# Patient Record
Sex: MALE | Race: OTHER | HISPANIC OR LATINO | URBAN - METROPOLITAN AREA
[De-identification: names, ages, dates, MRNs, and addresses within clinical notes are randomized per-mention and may not be internally consistent; named-entity substitution may affect disease eponyms.]

---

## 2018-01-20 ENCOUNTER — INPATIENT (INPATIENT)
Facility: HOSPITAL | Age: 53
LOS: 8 days | Discharge: LEFT BEFORE DISCHARGE | End: 2018-01-29
Attending: INTERNAL MEDICINE | Admitting: INTERNAL MEDICINE
Payer: MEDICAID

## 2018-01-20 VITALS
HEART RATE: 73 BPM | DIASTOLIC BLOOD PRESSURE: 138 MMHG | TEMPERATURE: 99 F | RESPIRATION RATE: 20 BRPM | SYSTOLIC BLOOD PRESSURE: 199 MMHG | OXYGEN SATURATION: 100 %

## 2018-01-20 DIAGNOSIS — I10 ESSENTIAL (PRIMARY) HYPERTENSION: ICD-10-CM

## 2018-01-20 DIAGNOSIS — N18.6 END STAGE RENAL DISEASE: ICD-10-CM

## 2018-01-20 DIAGNOSIS — I16.0 HYPERTENSIVE URGENCY: ICD-10-CM

## 2018-01-20 DIAGNOSIS — N18.9 CHRONIC KIDNEY DISEASE, UNSPECIFIED: ICD-10-CM

## 2018-01-20 DIAGNOSIS — D64.9 ANEMIA, UNSPECIFIED: ICD-10-CM

## 2018-01-20 DIAGNOSIS — Z29.9 ENCOUNTER FOR PROPHYLACTIC MEASURES, UNSPECIFIED: ICD-10-CM

## 2018-01-20 DIAGNOSIS — R06.02 SHORTNESS OF BREATH: ICD-10-CM

## 2018-01-20 LAB
ALBUMIN SERPL ELPH-MCNC: 4 G/DL — SIGNIFICANT CHANGE UP (ref 3.3–5)
ALP SERPL-CCNC: 41 U/L — SIGNIFICANT CHANGE UP (ref 40–120)
ALT FLD-CCNC: 16 U/L — SIGNIFICANT CHANGE UP (ref 4–41)
AST SERPL-CCNC: 12 U/L — SIGNIFICANT CHANGE UP (ref 4–40)
BASE EXCESS BLDV CALC-SCNC: -4.3 MMOL/L — SIGNIFICANT CHANGE UP
BASOPHILS # BLD AUTO: 0.03 K/UL — SIGNIFICANT CHANGE UP (ref 0–0.2)
BASOPHILS NFR BLD AUTO: 0.6 % — SIGNIFICANT CHANGE UP (ref 0–2)
BILIRUB SERPL-MCNC: 0.4 MG/DL — SIGNIFICANT CHANGE UP (ref 0.2–1.2)
BLOOD GAS VENOUS - CREATININE: 6.9 MG/DL — HIGH (ref 0.5–1.3)
BUN SERPL-MCNC: 65 MG/DL — HIGH (ref 7–23)
CALCIUM SERPL-MCNC: 8.7 MG/DL — SIGNIFICANT CHANGE UP (ref 8.4–10.5)
CHLORIDE BLDV-SCNC: 111 MMOL/L — HIGH (ref 96–108)
CHLORIDE SERPL-SCNC: 106 MMOL/L — SIGNIFICANT CHANGE UP (ref 98–107)
CO2 SERPL-SCNC: 20 MMOL/L — LOW (ref 22–31)
CREAT SERPL-MCNC: 6.03 MG/DL — HIGH (ref 0.5–1.3)
EOSINOPHIL # BLD AUTO: 0.19 K/UL — SIGNIFICANT CHANGE UP (ref 0–0.5)
EOSINOPHIL NFR BLD AUTO: 3.7 % — SIGNIFICANT CHANGE UP (ref 0–6)
FERRITIN SERPL-MCNC: 813.9 NG/ML — HIGH (ref 30–400)
GAS PNL BLDV: 136 MMOL/L — SIGNIFICANT CHANGE UP (ref 136–146)
GLUCOSE BLDV-MCNC: 96 — SIGNIFICANT CHANGE UP (ref 70–99)
GLUCOSE SERPL-MCNC: 98 MG/DL — SIGNIFICANT CHANGE UP (ref 70–99)
HAV IGM SER-ACNC: NONREACTIVE — SIGNIFICANT CHANGE UP
HBV CORE IGM SER-ACNC: NONREACTIVE — SIGNIFICANT CHANGE UP
HBV SURFACE AG SER-ACNC: NEGATIVE — SIGNIFICANT CHANGE UP
HBV SURFACE AG SER-ACNC: NONREACTIVE — SIGNIFICANT CHANGE UP
HCO3 BLDV-SCNC: 21 MMOL/L — SIGNIFICANT CHANGE UP (ref 20–27)
HCT VFR BLD CALC: 28.6 % — LOW (ref 39–50)
HCT VFR BLDV CALC: 29.2 % — LOW (ref 39–51)
HCV AB S/CO SERPL IA: 0.07 S/CO — SIGNIFICANT CHANGE UP
HCV AB SERPL-IMP: SIGNIFICANT CHANGE UP
HGB BLD-MCNC: 9.5 G/DL — LOW (ref 13–17)
HGB BLDV-MCNC: 9.4 G/DL — LOW (ref 13–17)
IMM GRANULOCYTES # BLD AUTO: 0.01 # — SIGNIFICANT CHANGE UP
IMM GRANULOCYTES NFR BLD AUTO: 0.2 % — SIGNIFICANT CHANGE UP (ref 0–1.5)
IRON SATN MFR SERPL: 219 UG/DL — SIGNIFICANT CHANGE UP (ref 155–535)
IRON SATN MFR SERPL: 98 UG/DL — SIGNIFICANT CHANGE UP (ref 45–165)
LACTATE BLDV-MCNC: 1.1 MMOL/L — SIGNIFICANT CHANGE UP (ref 0.5–2)
LYMPHOCYTES # BLD AUTO: 1.22 K/UL — SIGNIFICANT CHANGE UP (ref 1–3.3)
LYMPHOCYTES # BLD AUTO: 23.5 % — SIGNIFICANT CHANGE UP (ref 13–44)
MCHC RBC-ENTMCNC: 31.5 PG — SIGNIFICANT CHANGE UP (ref 27–34)
MCHC RBC-ENTMCNC: 33.2 % — SIGNIFICANT CHANGE UP (ref 32–36)
MCV RBC AUTO: 94.7 FL — SIGNIFICANT CHANGE UP (ref 80–100)
MONOCYTES # BLD AUTO: 0.38 K/UL — SIGNIFICANT CHANGE UP (ref 0–0.9)
MONOCYTES NFR BLD AUTO: 7.3 % — SIGNIFICANT CHANGE UP (ref 2–14)
NEUTROPHILS # BLD AUTO: 3.37 K/UL — SIGNIFICANT CHANGE UP (ref 1.8–7.4)
NEUTROPHILS NFR BLD AUTO: 64.7 % — SIGNIFICANT CHANGE UP (ref 43–77)
NRBC # FLD: 0 — SIGNIFICANT CHANGE UP
NT-PROBNP SERPL-SCNC: SIGNIFICANT CHANGE UP PG/ML
PCO2 BLDV: 39 MMHG — LOW (ref 41–51)
PH BLDV: 7.34 PH — SIGNIFICANT CHANGE UP (ref 7.32–7.43)
PLATELET # BLD AUTO: 131 K/UL — LOW (ref 150–400)
PMV BLD: 12.8 FL — SIGNIFICANT CHANGE UP (ref 7–13)
PO2 BLDV: 68 MMHG — HIGH (ref 35–40)
POTASSIUM BLDV-SCNC: 4.6 MMOL/L — HIGH (ref 3.4–4.5)
POTASSIUM SERPL-MCNC: 4.8 MMOL/L — SIGNIFICANT CHANGE UP (ref 3.5–5.3)
POTASSIUM SERPL-SCNC: 4.8 MMOL/L — SIGNIFICANT CHANGE UP (ref 3.5–5.3)
PROT SERPL-MCNC: 6.6 G/DL — SIGNIFICANT CHANGE UP (ref 6–8.3)
RBC # BLD: 3.02 M/UL — LOW (ref 4.2–5.8)
RBC # FLD: 13.3 % — SIGNIFICANT CHANGE UP (ref 10.3–14.5)
SAO2 % BLDV: 94.1 % — HIGH (ref 60–85)
SODIUM SERPL-SCNC: 141 MMOL/L — SIGNIFICANT CHANGE UP (ref 135–145)
UIBC SERPL-MCNC: 121 UG/DL — SIGNIFICANT CHANGE UP (ref 110–370)
WBC # BLD: 5.2 K/UL — SIGNIFICANT CHANGE UP (ref 3.8–10.5)
WBC # FLD AUTO: 5.2 K/UL — SIGNIFICANT CHANGE UP (ref 3.8–10.5)

## 2018-01-20 PROCEDURE — 71046 X-RAY EXAM CHEST 2 VIEWS: CPT | Mod: 26

## 2018-01-20 PROCEDURE — 99222 1ST HOSP IP/OBS MODERATE 55: CPT | Mod: GC

## 2018-01-20 PROCEDURE — 99223 1ST HOSP IP/OBS HIGH 75: CPT

## 2018-01-20 RX ORDER — SEVELAMER CARBONATE 2400 MG/1
2 POWDER, FOR SUSPENSION ORAL
Qty: 0 | Refills: 0 | COMMUNITY

## 2018-01-20 RX ORDER — ATORVASTATIN CALCIUM 80 MG/1
40 TABLET, FILM COATED ORAL AT BEDTIME
Qty: 0 | Refills: 0 | Status: DISCONTINUED | OUTPATIENT
Start: 2018-01-20 | End: 2018-01-29

## 2018-01-20 RX ORDER — AMLODIPINE BESYLATE 2.5 MG/1
10 TABLET ORAL ONCE
Qty: 0 | Refills: 0 | Status: COMPLETED | OUTPATIENT
Start: 2018-01-20 | End: 2018-01-20

## 2018-01-20 RX ORDER — HEPARIN SODIUM 5000 [USP'U]/ML
5000 INJECTION INTRAVENOUS; SUBCUTANEOUS EVERY 8 HOURS
Qty: 0 | Refills: 0 | Status: DISCONTINUED | OUTPATIENT
Start: 2018-01-20 | End: 2018-01-29

## 2018-01-20 RX ORDER — AMLODIPINE BESYLATE 2.5 MG/1
1 TABLET ORAL
Qty: 0 | Refills: 0 | COMMUNITY

## 2018-01-20 RX ORDER — HYDRALAZINE HCL 50 MG
10 TABLET ORAL ONCE
Qty: 0 | Refills: 0 | Status: COMPLETED | OUTPATIENT
Start: 2018-01-20 | End: 2018-01-20

## 2018-01-20 RX ORDER — LISINOPRIL 2.5 MG/1
40 TABLET ORAL DAILY
Qty: 0 | Refills: 0 | Status: DISCONTINUED | OUTPATIENT
Start: 2018-01-20 | End: 2018-01-29

## 2018-01-20 RX ORDER — AMLODIPINE BESYLATE 2.5 MG/1
10 TABLET ORAL DAILY
Qty: 0 | Refills: 0 | Status: DISCONTINUED | OUTPATIENT
Start: 2018-01-21 | End: 2018-01-29

## 2018-01-20 RX ORDER — ATORVASTATIN CALCIUM 80 MG/1
1 TABLET, FILM COATED ORAL
Qty: 0 | Refills: 0 | COMMUNITY

## 2018-01-20 RX ORDER — SEVELAMER CARBONATE 2400 MG/1
1600 POWDER, FOR SUSPENSION ORAL
Qty: 0 | Refills: 0 | Status: DISCONTINUED | OUTPATIENT
Start: 2018-01-20 | End: 2018-01-29

## 2018-01-20 RX ADMIN — LISINOPRIL 40 MILLIGRAM(S): 2.5 TABLET ORAL at 08:40

## 2018-01-20 RX ADMIN — ATORVASTATIN CALCIUM 40 MILLIGRAM(S): 80 TABLET, FILM COATED ORAL at 21:23

## 2018-01-20 RX ADMIN — AMLODIPINE BESYLATE 10 MILLIGRAM(S): 2.5 TABLET ORAL at 08:40

## 2018-01-20 RX ADMIN — Medication 10 MILLIGRAM(S): at 05:14

## 2018-01-20 RX ADMIN — Medication 10 MILLIGRAM(S): at 08:40

## 2018-01-20 RX ADMIN — HEPARIN SODIUM 5000 UNIT(S): 5000 INJECTION INTRAVENOUS; SUBCUTANEOUS at 21:23

## 2018-01-20 NOTE — H&P ADULT - ASSESSMENT
53 yo homeless M w/ HTN, ESRD on HD (MWF) who presents with SOB and missed HD, a/w hypertensive urgency and need to re-establish/receive HD.

## 2018-01-20 NOTE — ED PROVIDER NOTE - OBJECTIVE STATEMENT
53yo M w/ pmhx of HTN, DM, ESRD on HD MWF c/o of SOB since yesterday. Pt states that he missed dialysis for at least the past 2 weeks. Denies any chest pain, nausea, vomiting, fever, productive cough, leg swelling, hx of blood clots or any other symptoms.

## 2018-01-20 NOTE — H&P ADULT - PROBLEM SELECTOR PLAN 1
- BP elevated to 224/144 in ED, s/p IV hydralazine x2, with subsequent improvement in BP to SBP 160s and improvement in SOB symptoms  - c/w BP regimen, reinforce medication compliance, monitor BP and titrate regimen as needed  - also in setting of missed HD for 1.5 weeks  - appreciate renal assistance with HD

## 2018-01-20 NOTE — H&P ADULT - NSHPSOCIALHISTORY_GEN_ALL_CORE
Homeless, lives in shelter (motel) in [place], previous shelter in [place], no family in area  Denies smoking/alcohol/drugs

## 2018-01-20 NOTE — CONSULT NOTE ADULT - PROBLEM SELECTOR RECOMMENDATION 3
In setting of ESRD: Hb noted to be 9.5 which is not at goal. Check serum ferritin and TIBC. Monitor Hb level. In setting of ESRD: Hb noted to be 9.5 which is not at goal. Check serum ferritin and TIBC. Monitor Hb level.  Start PHYLLIS when blood pressure under better control.

## 2018-01-20 NOTE — H&P ADULT - PROBLEM SELECTOR PLAN 2
- ESRD likely 2/2 HTN  - receiving HD via R permacath but missed 1.5 weeks of HD, which patient attributes to change in shelter location ([place] to [place]) and inability to reach prior HD center, may need new center in [place]  - d/w renal fellow, appreciate consult, assistance with HD  - c/w Renagel, nephrovite and renal diet  - SW consult re: living situation and setup of otpt HD  - ?discussion re: AV fistula

## 2018-01-20 NOTE — CONSULT NOTE ADULT - PROBLEM SELECTOR RECOMMENDATION 9
Patient with history of ESRD on HD on MWF schedule. However patient has missed dialysis session since last week. Patient does not appear to be in fluid overload and electrolytes are in acceptable range. Will arrange for HD today.

## 2018-01-20 NOTE — H&P ADULT - NSHPLABSRESULTS_GEN_ALL_CORE
Labs reviewed personally. no leukocytosis, +anemia setting ESRD, Elevated BUN/Cr setting of ESRD, not hyperkalemic at this time, proBNP elevated but in setting of ESRD                          9.5    5.20  )-----------( 131      ( 20 Jan 2018 04:30 )             28.6     Hgb Trend: 9.5<--  01-20    141  |  106  |  65<H>  ----------------------------<  98  4.8   |  20<L>  |  6.03<H>    Ca    8.7      20 Jan 2018 04:30    TPro  6.6  /  Alb  4.0  /  TBili  0.4  /  DBili  x   /  AST  12  /  ALT  16  /  AlkPhos  41  01-20    Creatinine Trend: 6.03<--    proBNP [id number]    Imaging reviewed personally.  CXR clear lungs, Right sided dialysis cath present    EKG: NSR 70 bpm, left AFB

## 2018-01-20 NOTE — H&P ADULT - HISTORY OF PRESENT ILLNESS
51 yo M HTN, ESRD on HD M/W/F, presents with complaints of SOB x1 day after missing HD for 1.5 weeks. Patient is homeless and recently changed shelter locations from the [place] to [place]. His prior HD center (he does not know the name) was located close to his former shelter. He denies chest discomfort, LE edema, headaches/blurry vision. No fever or chills, though he does have a slight cough recently. He feels safe in his current shelter (@ [facility]), and does not have family in the area.    His blood pressure was elevated in the ED to 224/144, he received IV hydralazine x2 and home PO meds, subsequently reports his breathing to be better. States that he has been compliant with his medications. He has a right chest permacath and is still making a little urine. States no one ever talked to him about AV fistula creation...

## 2018-01-20 NOTE — ED ADULT NURSE NOTE - OBJECTIVE STATEMENT
Patient received in room #28 c/o sob starting today. Patient A&Ox3, reports he has not had dialysis x1week. Patient reports he has dialysis MWF. Michael noted to right chest wall. Patient denies any pain currently. NSR on CM. Respirations even and unlabored. Will monitor.

## 2018-01-20 NOTE — ED ADULT TRIAGE NOTE - CHIEF COMPLAINT QUOTE
Pt arrives from  [facility]  via [facility] As per EMT" Staff called he is complaining of shortness of breath." . As per pt " I have not gotten my diaylsis in week because I am moving around a lot." Denies Chest pain Resp even & unlabored. Hx CRF, HTN

## 2018-01-20 NOTE — CONSULT NOTE ADULT - PROBLEM SELECTOR RECOMMENDATION 2
BP noted to be elevated; could be secondary to missed dialysis sessions. C/w lisinopril and amlodipine. Monitor BP. BP noted to be elevated; could be secondary to missed dialysis sessions. C/w lisinopril and amlodipine. Monitor BP.  UF with HD as tolerated

## 2018-01-20 NOTE — ED PROVIDER NOTE - ATTENDING CONTRIBUTION TO CARE
53 y/o M with h/o HTN, ESRD on HD (via right sided chest port, MWF) here for SOB.  He is undomiciled and reports that his shelter moved from the [place] (where is also receives dialysis) to [place] and has been unable to go to dialysis for the past week.  Pt reports mild SOB occiasionally, but no fever, chills, cp, back pain, HA, vision changes, n/v.  Well appearing but disheveled, sitting comfortably in stretcher, awake and alert, nontoxic.  AF/hypertensive/VSS.  Distal pulses equal bilat.  No resp distress, no tachypnea, no hypoxia.  Lungs cta bl.  Cards nl S1/S2, RRR, no MRG.  Abd soft ntnd.  No pedal edema or calf tenderness.  Plan for ekg, labs, cxr, r/o emergent need for dialysis, BP control, admit.

## 2018-01-20 NOTE — H&P ADULT - NSHPPHYSICALEXAM_GEN_ALL_CORE
Vital Signs Last 24 Hrs  T(C): 36.8 (20 Jan 2018 07:01), Max: 37.1 (20 Jan 2018 02:54)  T(F): 98.3 (20 Jan 2018 07:01), Max: 98.8 (20 Jan 2018 02:54)  HR: 72 (20 Jan 2018 09:33) (70 - 75)  BP: 163/107 (20 Jan 2018 09:33) (163/107 - 224/144)  RR: 17 (20 Jan 2018 09:33) (15 - 20)  SpO2: 100% (20 Jan 2018 09:33) (100% - 100%)    PHYSICAL EXAM:  Constitutional: slightly disheveled male, sitting in gurney in NAD  Eyes: EOMI, clear sclera/conjunctiva  ENMT: MMM  Neck: supple, no JVD  Back: non-tender  Respiratory: CTAB, no respiratory distress on RA, speaking in full sentences  Cardiovascular: S1S2 RRR  Gastrointestinal: soft, non-tender +BS  Extremities: no c/c/e  Vascular: wwp, distal pulses intact  Neurological: AOx3, moving all extremities, non-focal  Skin: Right chest permacath site appears benign  Musculoskeletal: no joint tenderness or swelling  Psychiatric: calm

## 2018-01-20 NOTE — H&P ADULT - NSHPOUTPATIENTPROVIDERS_GEN_ALL_CORE
Does not know name of regular doctor or nephrologist, medication prescriber on bottle was Dr. Josefina Walton from [place]

## 2018-01-20 NOTE — ED ADULT NURSE NOTE - CHIEF COMPLAINT QUOTE
Pt arrives from  [facility]  via [facility] As per EMT" Staff called he is complaining of shortness of breath." . As per pt " I have not gotten my diaylsis in week because I am moving around a lot." Denies Chest pain Resp even & unlabored. Hx CRF, HTN

## 2018-01-20 NOTE — H&P ADULT - NSHPREVIEWOFSYSTEMS_GEN_ALL_CORE
REVIEW OF SYSTEMS  General:	no f/c  Skin/Breast: +dry skin 	  Ophthalmologic: no blurry vision  ENMT: no sore throat/dysphagia	  Respiratory and Thorax: +SOB, slight cough  Cardiovascular: no CP/palpitations/LE edema	  Gastrointestinal: no N/V/diarrhea, reports adequate PO intake	  Genitourinary: still making urine, no dysuria	  Musculoskeletal: no joint pain	  Neurological: no headache, no difficulty with ambulation	  Psychiatric: denies issues	  Hematology/Lymphatics: no easy bleeding/bruising	  Endocrine: denies diabetes	  Allergic/Immunologic: NKDA

## 2018-01-21 LAB
BUN SERPL-MCNC: 43 MG/DL — HIGH (ref 7–23)
CALCIUM SERPL-MCNC: 9.2 MG/DL — SIGNIFICANT CHANGE UP (ref 8.4–10.5)
CHLORIDE SERPL-SCNC: 107 MMOL/L — SIGNIFICANT CHANGE UP (ref 98–107)
CO2 SERPL-SCNC: 27 MMOL/L — SIGNIFICANT CHANGE UP (ref 22–31)
CREAT SERPL-MCNC: 5.18 MG/DL — HIGH (ref 0.5–1.3)
GLUCOSE SERPL-MCNC: 98 MG/DL — SIGNIFICANT CHANGE UP (ref 70–99)
HCT VFR BLD CALC: 32.5 % — LOW (ref 39–50)
HGB BLD-MCNC: 11.1 G/DL — LOW (ref 13–17)
MAGNESIUM SERPL-MCNC: 2.5 MG/DL — SIGNIFICANT CHANGE UP (ref 1.6–2.6)
MCHC RBC-ENTMCNC: 32.4 PG — SIGNIFICANT CHANGE UP (ref 27–34)
MCHC RBC-ENTMCNC: 34.2 % — SIGNIFICANT CHANGE UP (ref 32–36)
MCV RBC AUTO: 94.8 FL — SIGNIFICANT CHANGE UP (ref 80–100)
NRBC # FLD: 0 — SIGNIFICANT CHANGE UP
PHOSPHATE SERPL-MCNC: 4.3 MG/DL — SIGNIFICANT CHANGE UP (ref 2.5–4.5)
PLATELET # BLD AUTO: 166 K/UL — SIGNIFICANT CHANGE UP (ref 150–400)
PMV BLD: 12.7 FL — SIGNIFICANT CHANGE UP (ref 7–13)
POTASSIUM SERPL-MCNC: 4.8 MMOL/L — SIGNIFICANT CHANGE UP (ref 3.5–5.3)
POTASSIUM SERPL-SCNC: 4.8 MMOL/L — SIGNIFICANT CHANGE UP (ref 3.5–5.3)
RBC # BLD: 3.43 M/UL — LOW (ref 4.2–5.8)
RBC # FLD: 13.2 % — SIGNIFICANT CHANGE UP (ref 10.3–14.5)
SODIUM SERPL-SCNC: 146 MMOL/L — HIGH (ref 135–145)
WBC # BLD: 4.57 K/UL — SIGNIFICANT CHANGE UP (ref 3.8–10.5)
WBC # FLD AUTO: 4.57 K/UL — SIGNIFICANT CHANGE UP (ref 3.8–10.5)

## 2018-01-21 PROCEDURE — 99233 SBSQ HOSP IP/OBS HIGH 50: CPT

## 2018-01-21 RX ADMIN — AMLODIPINE BESYLATE 10 MILLIGRAM(S): 2.5 TABLET ORAL at 05:47

## 2018-01-21 RX ADMIN — SEVELAMER CARBONATE 1600 MILLIGRAM(S): 2400 POWDER, FOR SUSPENSION ORAL at 17:30

## 2018-01-21 RX ADMIN — ATORVASTATIN CALCIUM 40 MILLIGRAM(S): 80 TABLET, FILM COATED ORAL at 21:26

## 2018-01-21 RX ADMIN — HEPARIN SODIUM 5000 UNIT(S): 5000 INJECTION INTRAVENOUS; SUBCUTANEOUS at 21:26

## 2018-01-21 RX ADMIN — SEVELAMER CARBONATE 1600 MILLIGRAM(S): 2400 POWDER, FOR SUSPENSION ORAL at 12:11

## 2018-01-21 RX ADMIN — HEPARIN SODIUM 5000 UNIT(S): 5000 INJECTION INTRAVENOUS; SUBCUTANEOUS at 05:47

## 2018-01-21 RX ADMIN — SEVELAMER CARBONATE 1600 MILLIGRAM(S): 2400 POWDER, FOR SUSPENSION ORAL at 08:25

## 2018-01-21 RX ADMIN — Medication 1 TABLET(S): at 12:11

## 2018-01-21 RX ADMIN — LISINOPRIL 40 MILLIGRAM(S): 2.5 TABLET ORAL at 05:47

## 2018-01-21 NOTE — PROGRESS NOTE ADULT - SUBJECTIVE AND OBJECTIVE BOX
Patient is a 52y old  Male who presents with a chief complaint of SOB, missed dialysis (20 Jan 2018 09:41)      SUBJECTIVE / OVERNIGHT EVENTS: patient seen and examined by bedside at 9:50Am. no acute distress noted, s/p HD yesterday       MEDICATIONS  (STANDING):  amLODIPine   Tablet 10 milliGRAM(s) Oral daily  atorvastatin 40 milliGRAM(s) Oral at bedtime  heparin  Injectable 5000 Unit(s) SubCutaneous every 8 hours  lisinopril 40 milliGRAM(s) Oral daily  Nephro-shavon 1 Tablet(s) Oral daily  sevelamer hydrochloride 1600 milliGRAM(s) Oral three times a day with meals  vitamin B complex with vitamin C 1 Tablet(s) Oral daily    MEDICATIONS  (PRN):      Vital Signs Last 24 Hrs  T(C): 36.8 (21 Jan 2018 05:45), Max: 37.4 (20 Jan 2018 22:55)  T(F): 98.3 (21 Jan 2018 05:45), Max: 99.3 (20 Jan 2018 22:55)  HR: 68 (21 Jan 2018 05:45) (68 - 92)  BP: 160/96 (21 Jan 2018 05:45) (131/92 - 160/96)  BP(mean): --  RR: 16 (21 Jan 2018 05:45) (16 - 18)  SpO2: 100% (21 Jan 2018 05:45) (100% - 100%)  CAPILLARY BLOOD GLUCOSE        I&O's Summary    20 Jan 2018 07:01  -  21 Jan 2018 07:00  --------------------------------------------------------  IN: 600 mL / OUT: 3500 mL / NET: -2900 mL        PHYSICAL EXAM:  GENERAL: NAD, unkempt   HEAD:  Atraumatic, Normocephalic  EYES: EOMI, PERRLA, conjunctiva and sclera clear  NECK: Supple, No JVD  CHEST/LUNG: Clear to auscultation bilaterally; No wheeze  HEART: Regular rate and rhythm; No murmurs, rubs, or gallops  ABDOMEN: Soft, Nontender, Nondistended; Bowel sounds present  EXTREMITIES:  2+ Peripheral Pulses, No clubbing, cyanosis, or edema  PSYCH: AAOx3  NEUROLOGY: non-focal  SKIN: No rashes or lesions ,permacath in rt chest     LABS:                        11.1   4.57  )-----------( 166      ( 21 Jan 2018 06:50 )             32.5     01-21    146<H>  |  107  |  43<H>  ----------------------------<  98  4.8   |  27  |  5.18<H>    Ca    9.2      21 Jan 2018 06:50  Phos  4.3     01-21  Mg     2.5     01-21    TPro  6.6  /  Alb  4.0  /  TBili  0.4  /  DBili  x   /  AST  12  /  ALT  16  /  AlkPhos  41  01-20              RADIOLOGY & ADDITIONAL TESTS:    Imaging Personally Reviewed:    Consultant(s) Notes Reviewed:      Care Discussed with Consultants/Other Providers:

## 2018-01-22 LAB
BASOPHILS # BLD AUTO: 0.04 K/UL — SIGNIFICANT CHANGE UP (ref 0–0.2)
BASOPHILS NFR BLD AUTO: 1 % — SIGNIFICANT CHANGE UP (ref 0–2)
BUN SERPL-MCNC: 52 MG/DL — HIGH (ref 7–23)
CALCIUM SERPL-MCNC: 8.7 MG/DL — SIGNIFICANT CHANGE UP (ref 8.4–10.5)
CHLORIDE SERPL-SCNC: 98 MMOL/L — SIGNIFICANT CHANGE UP (ref 98–107)
CO2 SERPL-SCNC: 26 MMOL/L — SIGNIFICANT CHANGE UP (ref 22–31)
CREAT SERPL-MCNC: 6.07 MG/DL — HIGH (ref 0.5–1.3)
EOSINOPHIL # BLD AUTO: 0.12 K/UL — SIGNIFICANT CHANGE UP (ref 0–0.5)
EOSINOPHIL NFR BLD AUTO: 3 % — SIGNIFICANT CHANGE UP (ref 0–6)
GLUCOSE SERPL-MCNC: 112 MG/DL — HIGH (ref 70–99)
HCT VFR BLD CALC: 28.9 % — LOW (ref 39–50)
HGB BLD-MCNC: 9.5 G/DL — LOW (ref 13–17)
IMM GRANULOCYTES # BLD AUTO: 0.01 # — SIGNIFICANT CHANGE UP
IMM GRANULOCYTES NFR BLD AUTO: 0.2 % — SIGNIFICANT CHANGE UP (ref 0–1.5)
LYMPHOCYTES # BLD AUTO: 1.15 K/UL — SIGNIFICANT CHANGE UP (ref 1–3.3)
LYMPHOCYTES # BLD AUTO: 28.3 % — SIGNIFICANT CHANGE UP (ref 13–44)
MCHC RBC-ENTMCNC: 31.4 PG — SIGNIFICANT CHANGE UP (ref 27–34)
MCHC RBC-ENTMCNC: 32.9 % — SIGNIFICANT CHANGE UP (ref 32–36)
MCV RBC AUTO: 95.4 FL — SIGNIFICANT CHANGE UP (ref 80–100)
MONOCYTES # BLD AUTO: 0.37 K/UL — SIGNIFICANT CHANGE UP (ref 0–0.9)
MONOCYTES NFR BLD AUTO: 9.1 % — SIGNIFICANT CHANGE UP (ref 2–14)
NEUTROPHILS # BLD AUTO: 2.37 K/UL — SIGNIFICANT CHANGE UP (ref 1.8–7.4)
NEUTROPHILS NFR BLD AUTO: 58.4 % — SIGNIFICANT CHANGE UP (ref 43–77)
NRBC # FLD: 0 — SIGNIFICANT CHANGE UP
PLATELET # BLD AUTO: 157 K/UL — SIGNIFICANT CHANGE UP (ref 150–400)
PMV BLD: 12.5 FL — SIGNIFICANT CHANGE UP (ref 7–13)
POTASSIUM SERPL-MCNC: 4.2 MMOL/L — SIGNIFICANT CHANGE UP (ref 3.5–5.3)
POTASSIUM SERPL-SCNC: 4.2 MMOL/L — SIGNIFICANT CHANGE UP (ref 3.5–5.3)
RBC # BLD: 3.03 M/UL — LOW (ref 4.2–5.8)
RBC # FLD: 13 % — SIGNIFICANT CHANGE UP (ref 10.3–14.5)
SODIUM SERPL-SCNC: 136 MMOL/L — SIGNIFICANT CHANGE UP (ref 135–145)
WBC # BLD: 4.06 K/UL — SIGNIFICANT CHANGE UP (ref 3.8–10.5)
WBC # FLD AUTO: 4.06 K/UL — SIGNIFICANT CHANGE UP (ref 3.8–10.5)

## 2018-01-22 PROCEDURE — 99232 SBSQ HOSP IP/OBS MODERATE 35: CPT | Mod: GC

## 2018-01-22 PROCEDURE — 99233 SBSQ HOSP IP/OBS HIGH 50: CPT

## 2018-01-22 RX ADMIN — SEVELAMER CARBONATE 1600 MILLIGRAM(S): 2400 POWDER, FOR SUSPENSION ORAL at 12:10

## 2018-01-22 RX ADMIN — HEPARIN SODIUM 5000 UNIT(S): 5000 INJECTION INTRAVENOUS; SUBCUTANEOUS at 06:04

## 2018-01-22 RX ADMIN — SEVELAMER CARBONATE 1600 MILLIGRAM(S): 2400 POWDER, FOR SUSPENSION ORAL at 19:17

## 2018-01-22 RX ADMIN — LISINOPRIL 40 MILLIGRAM(S): 2.5 TABLET ORAL at 06:04

## 2018-01-22 RX ADMIN — Medication 1 TABLET(S): at 12:10

## 2018-01-22 RX ADMIN — SEVELAMER CARBONATE 1600 MILLIGRAM(S): 2400 POWDER, FOR SUSPENSION ORAL at 08:47

## 2018-01-22 RX ADMIN — ATORVASTATIN CALCIUM 40 MILLIGRAM(S): 80 TABLET, FILM COATED ORAL at 22:58

## 2018-01-22 RX ADMIN — AMLODIPINE BESYLATE 10 MILLIGRAM(S): 2.5 TABLET ORAL at 06:04

## 2018-01-22 RX ADMIN — HEPARIN SODIUM 5000 UNIT(S): 5000 INJECTION INTRAVENOUS; SUBCUTANEOUS at 22:58

## 2018-01-22 NOTE — DIETITIAN INITIAL EVALUATION ADULT. - ETIOLOGY
Questionably adequate PO nutrient/energy intake due to increased physiological demands associated w ESRD on HD.

## 2018-01-22 NOTE — PROGRESS NOTE ADULT - SUBJECTIVE AND OBJECTIVE BOX
Patient is a 52y old  Male who presents with a chief complaint of SOB, missed dialysis (20 Jan 2018 09:41)      SUBJECTIVE / OVERNIGHT EVENTS:  Pt ambulates, feels well, denied complaints    MEDICATIONS  (STANDING):  amLODIPine   Tablet 10 milliGRAM(s) Oral daily  atorvastatin 40 milliGRAM(s) Oral at bedtime  heparin  Injectable 5000 Unit(s) SubCutaneous every 8 hours  lisinopril 40 milliGRAM(s) Oral daily  Nephro-shavon 1 Tablet(s) Oral daily  sevelamer hydrochloride 1600 milliGRAM(s) Oral three times a day with meals  vitamin B complex with vitamin C 1 Tablet(s) Oral daily    MEDICATIONS  (PRN):      T(C): 36.6 (01-22-18 @ 15:40), Max: 37 (01-22-18 @ 05:43)  HR: 72 (01-22-18 @ 15:40) (67 - 74)  BP: 162/106 (01-22-18 @ 15:40) (152/113 - 177/113)  RR: 18 (01-22-18 @ 15:40) (17 - 18)  SpO2: 100% (01-22-18 @ 15:40) (100% - 100%)  CAPILLARY BLOOD GLUCOSE        I&O's Summary      PHYSICAL EXAM:  GENERAL: NAD, well-developed  HEAD:  Atraumatic, Normocephalic  EYES: EOMI, PERRLA, conjunctiva and sclera clear  NECK: Supple, No JVD  CHEST/LUNG: Clear to auscultation bilaterally; No wheeze  HEART: s1 s2, regular rhythm and rate   ABDOMEN: Soft, Nontender, Nondistended; Bowel sounds present  EXTREMITIES:  2+ Peripheral Pulses, No clubbing, cyanosis, or edema  PSYCH: AAOx3, flat affect  NEUROLOGY: non-focal  SKIN: No rashes or lesions    LABS:                        9.5    4.06  )-----------( 157      ( 22 Jan 2018 15:50 )             28.9     01-22    136  |  98  |  52<H>  ----------------------------<  112<H>  4.2   |  26  |  6.07<H>    Ca    8.7      22 Jan 2018 15:50  Phos  4.3     01-21  Mg     2.5     01-21                RADIOLOGY & ADDITIONAL TESTS:    Imaging Personally Reviewed:    Consultant(s) Notes Reviewed:      Care Discussed with Consultants/Other Providers:

## 2018-01-22 NOTE — DIETITIAN INITIAL EVALUATION ADULT. - PROBLEM SELECTOR PLAN 2
- ESRD likely 2/2 HTN  - receiving HD via R permacath but missed 1.5 weeks of HD, which patient attributes to change in shelter location ([place] to [place]) and inability to reach prior HD center, may need new center in [place]  - d/w renal fellow, appreciate consult, assistance with HD  - c/w Renagel, nephrovite and renal diet  - SW consult re: living situation and setup of otpt HD  - ?discussion re: AV fistula

## 2018-01-22 NOTE — DIETITIAN INITIAL EVALUATION ADULT. - NS AS NUTRI INTERV COLLABORAT
1)Add Nepro 8oz PO 2x daily to Renal, DASH/TLC (cholesterol & Na restricted) diet.                 2)Obtain current and pre/post HD weights.                            3)Continue Nephrovite for micronutrient coverage.                    4)RDN remains available.  Angie Benitez RDN, CD/N  pager [phone]

## 2018-01-22 NOTE — PROGRESS NOTE ADULT - SUBJECTIVE AND OBJECTIVE BOX
[facility] DIVISION OF KIDNEY DISEASES AND HYPERTENSION -- FOLLOW UP NOTE  --------------------------------------------------------------------------------  Chief Complaint:  ESRD    HPI:  Patient is a 52 year old male with h/o ESRD on HD (MWF, for 1 year) and HTN who presented to ER with SOB in setting of missed dialysis. He moved from a shelter in the [place] to [place], and missed dialysis for about a week as he did not have HD set up in [place]. Patient underwent HD on admission and symptoms resolved.    24 hour events/subjective:  Patient feeling well today. Denies any chest pain or SOB or edema.      PAST HISTORY  --------------------------------------------------------------------------------  No significant changes to PMH, PSH, FHx, SHx, unless otherwise noted    ALLERGIES & MEDICATIONS  --------------------------------------------------------------------------------  Allergies    No Known Allergies    Intolerances      Standing Inpatient Medications  amLODIPine   Tablet 10 milliGRAM(s) Oral daily  atorvastatin 40 milliGRAM(s) Oral at bedtime  heparin  Injectable 5000 Unit(s) SubCutaneous every 8 hours  lisinopril 40 milliGRAM(s) Oral daily  Nephro-shavon 1 Tablet(s) Oral daily  sevelamer hydrochloride 1600 milliGRAM(s) Oral three times a day with meals  vitamin B complex with vitamin C 1 Tablet(s) Oral daily    PRN Inpatient Medications      REVIEW OF SYSTEMS  --------------------------------------------------------------------------------  Gen: no fatigue, fevers/chills  Skin: No rashes  Respiratory: No dyspnea, cough  CV: No chest pain  GI: No abdominal pain  MSK: No edema    VITALS/PHYSICAL EXAM  --------------------------------------------------------------------------------  T(C): 37 (01-22-18 @ 05:43), Max: 37 (01-22-18 @ 05:43)  HR: 74 (01-22-18 @ 05:51) (67 - 74)  BP: 174/106 (01-22-18 @ 09:08) (142/88 - 177/113)  RR: 18 (01-22-18 @ 05:43) (17 - 18)  SpO2: 100% (01-22-18 @ 05:43) (100% - 100%)  Wt(kg): --  Height (cm): 180.34 (01-20-18 @ 15:06)  Weight (kg): 50.4 (01-20-18 @ 15:06)  BMI (kg/m2): 15.5 (01-20-18 @ 15:06)  BSA (m2): 1.64 (01-20-18 @ 15:06)      Physical Exam:  	Gen: NAD, well-appearing, thin male, sitting in chair  	HEENT: supple neck  	Pulm: CTA B/L  	CV: RRR, S1S2; no rub  	Abd: +BS, soft, nontender/nondistended  	: No suprapubic fullness  	UE: Warm, no edema  	LE: Warm, no edema  	Neuro: follows commands  	Psych: Normal affect and mood  	Skin: Warm, without rashes  	Vascular access:  right chest tunneled HD catheter in place - site without erythema or drainage, no ttp    LABS/STUDIES  --------------------------------------------------------------------------------              11.1   4.57  >-----------<  166      [01-21-18 @ 06:50]              32.5     146  |  107  |  43  ----------------------------<  98      [01-21-18 @ 06:50]  4.8   |  27  |  5.18        Ca     9.2     [01-21-18 @ 06:50]      Mg     2.5     [01-21-18 @ 06:50]      Phos  4.3     [01-21-18 @ 06:50]            Creatinine Trend:  SCr 5.18 [01-21 @ 06:50]  SCr 6.03 [01-20 @ 04:30]        Iron 98, TIBC 219, %sat --      [01-20-18 @ 14:00]  Ferritin 813.9      [01-20-18 @ 14:00]    HBsAg NEGATIVE      [01-20-18 @ 11:30]  HCV 0.07, Nonreactive Hepatitis C AB  S/CO Ratio                        Interpretation  < 1.0                                     Non-Reactive  1.0 - 4.9                           Weakly-Reactive  > 5.0                                 Reactive  Non-Reactive: Aperson with a non-reactive HCV antibody  result is considered uninfected.  No further action is  needed unless recent infection is suspected.  In these  cases, consider repeat testing later to detect  seroconversion..  Weakly-Reactive: HCV antibody test is abnormal, HCV RNA  Qualitative test will follow.  Reactive: HCV antibody test is abnormal, HCV RNA  Qualitative test will follow.  Note: HCV antibody testing is performed on the [employer] system.      [01-20-18 @ 09:00] [facility] DIVISION OF KIDNEY DISEASES AND HYPERTENSION -- FOLLOW UP NOTE  --------------------------------------------------------------------------------  Chief Complaint:  ESRD    HPI:  Patient is a 52 year old male with h/o ESRD on HD (MWF, for 1 year) and HTN who presented to ER with SOB in setting of missed dialysis. He moved from a shelter in the [place] to [place], and missed dialysis for about a week as he did not have HD set up in [place]. Patient underwent HD on admission and symptoms resolved.    24 hour events/subjective:  Patient feeling well today. Denies any chest pain or SOB or edema.      PAST HISTORY  --------------------------------------------------------------------------------  No significant changes to PMH, PSH, FHx, SHx, unless otherwise noted    ALLERGIES & MEDICATIONS  --------------------------------------------------------------------------------  Allergies    No Known Allergies    Intolerances      Standing Inpatient Medications  amLODIPine   Tablet 10 milliGRAM(s) Oral daily  atorvastatin 40 milliGRAM(s) Oral at bedtime  heparin  Injectable 5000 Unit(s) SubCutaneous every 8 hours  lisinopril 40 milliGRAM(s) Oral daily  Nephro-shavon 1 Tablet(s) Oral daily  sevelamer hydrochloride 1600 milliGRAM(s) Oral three times a day with meals  vitamin B complex with vitamin C 1 Tablet(s) Oral daily    PRN Inpatient Medications      REVIEW OF SYSTEMS  --------------------------------------------------------------------------------  Gen: no fatigue, fevers/chills  Skin: No rashes  Respiratory: No dyspnea, cough  CV: No chest pain  GI: No abdominal pain  MSK: No edema    VITALS/PHYSICAL EXAM  --------------------------------------------------------------------------------  T(C): 37 (01-22-18 @ 05:43), Max: 37 (01-22-18 @ 05:43)  HR: 74 (01-22-18 @ 05:51) (67 - 74)  BP: 174/106 (01-22-18 @ 09:08) (142/88 - 177/113)  RR: 18 (01-22-18 @ 05:43) (17 - 18)  SpO2: 100% (01-22-18 @ 05:43) (100% - 100%)  Wt(kg): --  Height (cm): 180.34 (01-20-18 @ 15:06)  Weight (kg): 50.4 (01-20-18 @ 15:06)  BMI (kg/m2): 15.5 (01-20-18 @ 15:06)  BSA (m2): 1.64 (01-20-18 @ 15:06)      Physical Exam:  	Gen: NAD, well-appearing, thin male, sitting in chair  	Pulm: CTA B/L  	CV: RRR, S1S2; no rub  	Abd: +BS, soft, nontender/nondistended  	Neuro: follows commands  	Psych: Normal affect and mood  	Skin: Warm, without rashes  	Vascular access:  right chest tunneled HD catheter in place - site without erythema or drainage, no ttp    LABS/STUDIES  --------------------------------------------------------------------------------              11.1   4.57  >-----------<  166      [01-21-18 @ 06:50]              32.5     146  |  107  |  43  ----------------------------<  98      [01-21-18 @ 06:50]  4.8   |  27  |  5.18        Ca     9.2     [01-21-18 @ 06:50]      Mg     2.5     [01-21-18 @ 06:50]      Phos  4.3     [01-21-18 @ 06:50]            Creatinine Trend:  SCr 5.18 [01-21 @ 06:50]  SCr 6.03 [01-20 @ 04:30]        Iron 98, TIBC 219, %sat --      [01-20-18 @ 14:00]  Ferritin 813.9      [01-20-18 @ 14:00]    HBsAg NEGATIVE      [01-20-18 @ 11:30]  HCV 0.07, Nonreactive Hepatitis C AB  S/CO Ratio                        Interpretation  < 1.0                                     Non-Reactive  1.0 - 4.9                           Weakly-Reactive  > 5.0                                 Reactive  Non-Reactive: Aperson with a non-reactive HCV antibody  result is considered uninfected.  No further action is  needed unless recent infection is suspected.  In these  cases, consider repeat testing later to detect  seroconversion..  Weakly-Reactive: HCV antibody test is abnormal, HCV RNA  Qualitative test will follow.  Reactive: HCV antibody test is abnormal, HCV RNA  Qualitative test will follow.  Note: HCV antibody testing is performed on the [employer]   system.      [01-20-18 @ 09:00]

## 2018-01-22 NOTE — DIETITIAN INITIAL EVALUATION ADULT. - PHYSICAL APPEARANCE
underweight/Muscle loss of posterior calf region, protruding & prominent bones of clavicle and acromion regions.  Subcutaneous fat loss of upper arm region.

## 2018-01-22 NOTE — DIETITIAN INITIAL EVALUATION ADULT. - OTHER INFO
Pt. observed w <75% PO intake of lunch.  Per PCA, Pt. had good intake of breakfast.   Pt. denies food allergies, nausea/vomiting/diarrhea/constipation, or issues with chewing/swallowing.  No noted edema, skin intact.  When RDN inquired about Pt.'s usual body/dry weight, he initially states "5" [kg] & asked how many pounds that is.   RDN attempted to clarify and Pt. then responded w "58" [kg].  However, he was not specific as to if he has had any recent significant weight changes PTA.  Attempted to obtain current bed scale weight, although bedscale not properly functioning.  Reviewed therapeutic diet modifications, of which Pt. demonstrates some prior knowledge of.  Also offered Nepro which Pt. is accepting to consume.

## 2018-01-23 LAB
BUN SERPL-MCNC: 28 MG/DL — HIGH (ref 7–23)
CALCIUM SERPL-MCNC: 8.9 MG/DL — SIGNIFICANT CHANGE UP (ref 8.4–10.5)
CHLORIDE SERPL-SCNC: 97 MMOL/L — LOW (ref 98–107)
CO2 SERPL-SCNC: 26 MMOL/L — SIGNIFICANT CHANGE UP (ref 22–31)
CREAT SERPL-MCNC: 4.22 MG/DL — HIGH (ref 0.5–1.3)
GLUCOSE SERPL-MCNC: 92 MG/DL — SIGNIFICANT CHANGE UP (ref 70–99)
HCT VFR BLD CALC: 31.8 % — LOW (ref 39–50)
HGB BLD-MCNC: 10.8 G/DL — LOW (ref 13–17)
MAGNESIUM SERPL-MCNC: 2.3 MG/DL — SIGNIFICANT CHANGE UP (ref 1.6–2.6)
MCHC RBC-ENTMCNC: 31.9 PG — SIGNIFICANT CHANGE UP (ref 27–34)
MCHC RBC-ENTMCNC: 34 % — SIGNIFICANT CHANGE UP (ref 32–36)
MCV RBC AUTO: 93.8 FL — SIGNIFICANT CHANGE UP (ref 80–100)
NRBC # FLD: 0 — SIGNIFICANT CHANGE UP
PHOSPHATE SERPL-MCNC: 3.8 MG/DL — SIGNIFICANT CHANGE UP (ref 2.5–4.5)
PLATELET # BLD AUTO: 166 K/UL — SIGNIFICANT CHANGE UP (ref 150–400)
PMV BLD: 12.8 FL — SIGNIFICANT CHANGE UP (ref 7–13)
POTASSIUM SERPL-MCNC: 4 MMOL/L — SIGNIFICANT CHANGE UP (ref 3.5–5.3)
POTASSIUM SERPL-SCNC: 4 MMOL/L — SIGNIFICANT CHANGE UP (ref 3.5–5.3)
RBC # BLD: 3.39 M/UL — LOW (ref 4.2–5.8)
RBC # FLD: 12.8 % — SIGNIFICANT CHANGE UP (ref 10.3–14.5)
SODIUM SERPL-SCNC: 138 MMOL/L — SIGNIFICANT CHANGE UP (ref 135–145)
WBC # BLD: 4.31 K/UL — SIGNIFICANT CHANGE UP (ref 3.8–10.5)
WBC # FLD AUTO: 4.31 K/UL — SIGNIFICANT CHANGE UP (ref 3.8–10.5)

## 2018-01-23 PROCEDURE — 99233 SBSQ HOSP IP/OBS HIGH 50: CPT

## 2018-01-23 RX ADMIN — AMLODIPINE BESYLATE 10 MILLIGRAM(S): 2.5 TABLET ORAL at 06:34

## 2018-01-23 RX ADMIN — SEVELAMER CARBONATE 1600 MILLIGRAM(S): 2400 POWDER, FOR SUSPENSION ORAL at 18:30

## 2018-01-23 RX ADMIN — Medication 1 TABLET(S): at 12:18

## 2018-01-23 RX ADMIN — ATORVASTATIN CALCIUM 40 MILLIGRAM(S): 80 TABLET, FILM COATED ORAL at 22:14

## 2018-01-23 RX ADMIN — HEPARIN SODIUM 5000 UNIT(S): 5000 INJECTION INTRAVENOUS; SUBCUTANEOUS at 06:34

## 2018-01-23 RX ADMIN — SEVELAMER CARBONATE 1600 MILLIGRAM(S): 2400 POWDER, FOR SUSPENSION ORAL at 08:48

## 2018-01-23 RX ADMIN — SEVELAMER CARBONATE 1600 MILLIGRAM(S): 2400 POWDER, FOR SUSPENSION ORAL at 12:18

## 2018-01-23 RX ADMIN — HEPARIN SODIUM 5000 UNIT(S): 5000 INJECTION INTRAVENOUS; SUBCUTANEOUS at 22:14

## 2018-01-23 RX ADMIN — LISINOPRIL 40 MILLIGRAM(S): 2.5 TABLET ORAL at 06:34

## 2018-01-23 NOTE — PROGRESS NOTE ADULT - SUBJECTIVE AND OBJECTIVE BOX
Patient is a 52y old  Male who presents with a chief complaint of SOB, missed dialysis (20 Jan 2018 09:41)      SUBJECTIVE / OVERNIGHT EVENTS:  Pt feels well, no complaints. ambulating. not engaged in interview     MEDICATIONS  (STANDING):  amLODIPine   Tablet 10 milliGRAM(s) Oral daily  atorvastatin 40 milliGRAM(s) Oral at bedtime  heparin  Injectable 5000 Unit(s) SubCutaneous every 8 hours  lisinopril 40 milliGRAM(s) Oral daily  Nephro-shavon 1 Tablet(s) Oral daily  sevelamer hydrochloride 1600 milliGRAM(s) Oral three times a day with meals  vitamin B complex with vitamin C 1 Tablet(s) Oral daily    MEDICATIONS  (PRN):      T(C): 36.9 (01-23-18 @ 12:39), Max: 36.9 (01-23-18 @ 06:33)  HR: 88 (01-23-18 @ 12:39) (68 - 88)  BP: 124/95 (01-23-18 @ 12:39) (124/95 - 162/106)  RR: 18 (01-23-18 @ 12:39) (18 - 18)  SpO2: 100% (01-23-18 @ 12:39) (100% - 100%)  CAPILLARY BLOOD GLUCOSE        I&O's Summary    22 Jan 2018 07:01  -  23 Jan 2018 07:00  --------------------------------------------------------  IN: 400 mL / OUT: 2900 mL / NET: -2500 mL        PHYSICAL EXAM:  GENERAL: NAD, well-developed  HEAD:  Atraumatic, Normocephalic  EYES: EOMI, PERRLA, conjunctiva and sclera clear  NECK: Supple, No JVD  CHEST/LUNG: Clear to auscultation bilaterally; No wheeze  HEART: s1 s2, regular rhythm and rate   ABDOMEN: Soft, Nontender, Nondistended; Bowel sounds present  EXTREMITIES:  2+ Peripheral Pulses, No clubbing, cyanosis, or edema  PSYCH: AAOx3, flat affect  NEUROLOGY: non-focal  SKIN: No rashes or lesions    LABS:                        10.8   4.31  )-----------( 166      ( 23 Jan 2018 05:10 )             31.8     01-23    138  |  97<L>  |  28<H>  ----------------------------<  92  4.0   |  26  |  4.22<H>    Ca    8.9      23 Jan 2018 05:10  Phos  3.8     01-23  Mg     2.3     01-23                RADIOLOGY & ADDITIONAL TESTS:    Imaging Personally Reviewed:    Consultant(s) Notes Reviewed:      Care Discussed with Consultants/Other Providers:

## 2018-01-24 LAB
BUN SERPL-MCNC: 46 MG/DL — HIGH (ref 7–23)
CALCIUM SERPL-MCNC: 9.2 MG/DL — SIGNIFICANT CHANGE UP (ref 8.4–10.5)
CHLORIDE SERPL-SCNC: 100 MMOL/L — SIGNIFICANT CHANGE UP (ref 98–107)
CO2 SERPL-SCNC: 26 MMOL/L — SIGNIFICANT CHANGE UP (ref 22–31)
CREAT SERPL-MCNC: 5.95 MG/DL — HIGH (ref 0.5–1.3)
GLUCOSE SERPL-MCNC: 89 MG/DL — SIGNIFICANT CHANGE UP (ref 70–99)
HCT VFR BLD CALC: 29.4 % — LOW (ref 39–50)
HGB BLD-MCNC: 9.8 G/DL — LOW (ref 13–17)
MAGNESIUM SERPL-MCNC: 2.6 MG/DL — SIGNIFICANT CHANGE UP (ref 1.6–2.6)
MCHC RBC-ENTMCNC: 31.2 PG — SIGNIFICANT CHANGE UP (ref 27–34)
MCHC RBC-ENTMCNC: 33.3 % — SIGNIFICANT CHANGE UP (ref 32–36)
MCV RBC AUTO: 93.6 FL — SIGNIFICANT CHANGE UP (ref 80–100)
NRBC # FLD: 0 — SIGNIFICANT CHANGE UP
PHOSPHATE SERPL-MCNC: 5 MG/DL — HIGH (ref 2.5–4.5)
PLATELET # BLD AUTO: 153 K/UL — SIGNIFICANT CHANGE UP (ref 150–400)
PMV BLD: 12.7 FL — SIGNIFICANT CHANGE UP (ref 7–13)
POTASSIUM SERPL-MCNC: 4.3 MMOL/L — SIGNIFICANT CHANGE UP (ref 3.5–5.3)
POTASSIUM SERPL-SCNC: 4.3 MMOL/L — SIGNIFICANT CHANGE UP (ref 3.5–5.3)
RBC # BLD: 3.14 M/UL — LOW (ref 4.2–5.8)
RBC # FLD: 12.8 % — SIGNIFICANT CHANGE UP (ref 10.3–14.5)
SODIUM SERPL-SCNC: 140 MMOL/L — SIGNIFICANT CHANGE UP (ref 135–145)
WBC # BLD: 4.13 K/UL — SIGNIFICANT CHANGE UP (ref 3.8–10.5)
WBC # FLD AUTO: 4.13 K/UL — SIGNIFICANT CHANGE UP (ref 3.8–10.5)

## 2018-01-24 PROCEDURE — 99233 SBSQ HOSP IP/OBS HIGH 50: CPT

## 2018-01-24 PROCEDURE — 90935 HEMODIALYSIS ONE EVALUATION: CPT | Mod: GC

## 2018-01-24 RX ADMIN — Medication 1 TABLET(S): at 13:02

## 2018-01-24 RX ADMIN — SEVELAMER CARBONATE 1600 MILLIGRAM(S): 2400 POWDER, FOR SUSPENSION ORAL at 13:02

## 2018-01-24 RX ADMIN — LISINOPRIL 40 MILLIGRAM(S): 2.5 TABLET ORAL at 17:28

## 2018-01-24 RX ADMIN — ATORVASTATIN CALCIUM 40 MILLIGRAM(S): 80 TABLET, FILM COATED ORAL at 22:19

## 2018-01-24 RX ADMIN — Medication 1 TABLET(S): at 13:01

## 2018-01-24 RX ADMIN — AMLODIPINE BESYLATE 10 MILLIGRAM(S): 2.5 TABLET ORAL at 13:01

## 2018-01-24 RX ADMIN — SEVELAMER CARBONATE 1600 MILLIGRAM(S): 2400 POWDER, FOR SUSPENSION ORAL at 17:28

## 2018-01-24 RX ADMIN — SEVELAMER CARBONATE 1600 MILLIGRAM(S): 2400 POWDER, FOR SUSPENSION ORAL at 09:26

## 2018-01-24 NOTE — PROGRESS NOTE ADULT - SUBJECTIVE AND OBJECTIVE BOX
[facility] DIVISION OF KIDNEY DISEASES AND HYPERTENSION -- HEMODIALYSIS NOTE  --------------------------------------------------------------------------------  Chief Complaint: ESRD/Ongoing hemodialysis requirement    24 hour events/subjective:        PAST HISTORY  --------------------------------------------------------------------------------  No significant changes to PMH, PSH, FHx, SHx, unless otherwise noted    ALLERGIES & MEDICATIONS  --------------------------------------------------------------------------------  Allergies    No Known Allergies    Intolerances      Standing Inpatient Medications  amLODIPine   Tablet 10 milliGRAM(s) Oral daily  atorvastatin 40 milliGRAM(s) Oral at bedtime  heparin  Injectable 5000 Unit(s) SubCutaneous every 8 hours  lisinopril 40 milliGRAM(s) Oral daily  Nephro-shavon 1 Tablet(s) Oral daily  sevelamer hydrochloride 1600 milliGRAM(s) Oral three times a day with meals  vitamin B complex with vitamin C 1 Tablet(s) Oral daily    PRN Inpatient Medications      REVIEW OF SYSTEMS  --------------------------------------------------------------------------------  Gen: No weight changes, fatigue, fevers/chills, weakness  Skin: No rashes  Head/Eyes/Ears/Mouth: No headache; Normal hearing; Normal vision w/o blurriness; No sinus pain/discomfort, sore throat  Respiratory: No dyspnea, cough, wheezing, hemoptysis  CV: No chest pain, PND, orthopnea  GI: No abdominal pain, diarrhea, constipation, nausea, vomiting, melena, hematochezia  : No increased frequency, dysuria, hematuria, nocturia  MSK: No joint pain/swelling; no back pain; no edema  Neuro: No dizziness/lightheadedness, weakness, seizures, numbness, tingling  Heme: No easy bruising or bleeding  Endo: No heat/cold intolerance  Psych: No significant nervousness, anxiety, stress, depression    All other systems were reviewed and are negative, except as noted.    VITALS/PHYSICAL EXAM  --------------------------------------------------------------------------------  T(C): 36.8 (01-24-18 @ 12:59), Max: 37.1 (01-23-18 @ 22:01)  HR: 85 (01-24-18 @ 12:59) (69 - 85)  BP: 146/94 (01-24-18 @ 12:59) (123/90 - 151/110)  RR: 18 (01-24-18 @ 12:59) (18 - 18)  SpO2: 99% (01-24-18 @ 12:59) (99% - 100%)  Wt(kg): --        01-24-18 @ 07:01  -  01-24-18 @ 13:15  --------------------------------------------------------  IN: 400 mL / OUT: 2900 mL / NET: -2500 mL      Physical Exam:  	Gen: NAD, well-appearing  	HEENT: PERRL, supple neck, clear oropharynx  	Pulm: CTA B/L  	CV: RRR, S1S2; no rub  	Back: No spinal or CVA tenderness; no sacral edema  	Abd: +BS, soft, nontender/nondistended  	: No suprapubic tenderness  	UE: Warm, FROM, no clubbing, intact strength; no edema; no asterixis  	LE: Warm, FROM, no clubbing, intact strength; no edema  	Neuro: No focal deficits, intact gait  	Psych: Normal affect and mood  	Skin: Warm, without rashes  	Vascular access:    LABS/STUDIES  --------------------------------------------------------------------------------              9.8    4.13  >-----------<  153      [01-24-18 @ 06:55]              29.4     140  |  100  |  46  ----------------------------<  89      [01-24-18 @ 06:55]  4.3   |  26  |  5.95        Ca     9.2     [01-24-18 @ 06:55]      Mg     2.6     [01-24-18 @ 06:55]      Phos  5.0     [01-24-18 @ 06:55]            Iron 98, TIBC 219, %sat --      [01-20-18 @ 14:00]  Ferritin 813.9      [01-20-18 @ 14:00]    HBsAg NEGATIVE      [01-20-18 @ 11:30]  HCV 0.07, Nonreactive Hepatitis C AB  S/CO Ratio                        Interpretation  < 1.0                                     Non-Reactive  1.0 - 4.9                           Weakly-Reactive  > 5.0                                 Reactive  Non-Reactive: Aperson with a non-reactive HCV antibody  result is considered uninfected.  No further action is  needed unless recent infection is suspected.  In these  cases, consider repeat testing later to detect  seroconversion..  Weakly-Reactive: HCV antibody test is abnormal, HCV RNA  Qualitative test will follow.  Reactive: HCV antibody test is abnormal, HCV RNA  Qualitative test will follow.  Note: HCV antibody testing is performed on the [employer] system.      [01-20-18 @ 09:00]

## 2018-01-24 NOTE — PROGRESS NOTE ADULT - ATTENDING COMMENTS
Patient examined and ROS reviewed. A case of ESRD examined during dialysis. Patient tolerating dialysis well.  Blood flow through access adequate. Advised to continue UF.

## 2018-01-24 NOTE — PROGRESS NOTE ADULT - SUBJECTIVE AND OBJECTIVE BOX
[facility] DIVISION OF KIDNEY DISEASES AND HYPERTENSION -- FOLLOW UP NOTE  --------------------------------------------------------------------------------  Chief Complaint:  ESRD    HPI:  Patient is a 52 year old male with ESRD on HD MWF, and HTN who presented with SOB in setting of missed dialysis for 1 week. Patient has history of ESRD secondary to HTN and has been on HD for the past 1 year. Patient went to dialysis center in the [place] where his shelter was, but moved to a new shelter in [place] without having his HD set up. Patient has right tunneled HD catheter which was placed 1 year ago.     24 hour events/subjective:  Patient seen during HD. Tolerating HD well. No acute complaints.      PAST HISTORY  --------------------------------------------------------------------------------  No significant changes to PMH, PSH, FHx, SHx, unless otherwise noted    ALLERGIES & MEDICATIONS  --------------------------------------------------------------------------------  Allergies    No Known Allergies    Intolerances      Standing Inpatient Medications  amLODIPine   Tablet 10 milliGRAM(s) Oral daily  atorvastatin 40 milliGRAM(s) Oral at bedtime  heparin  Injectable 5000 Unit(s) SubCutaneous every 8 hours  lisinopril 40 milliGRAM(s) Oral daily  Nephro-shavon 1 Tablet(s) Oral daily  sevelamer hydrochloride 1600 milliGRAM(s) Oral three times a day with meals  vitamin B complex with vitamin C 1 Tablet(s) Oral daily    PRN Inpatient Medications      REVIEW OF SYSTEMS  --------------------------------------------------------------------------------  Gen: no fatigue, fevers/chills  Respiratory: No dyspnea, cough  CV: No chest pain  GI: No abdominal pain  MSK: No edema    VITALS/PHYSICAL EXAM  --------------------------------------------------------------------------------  T(C): 36.6 (01-24-18 @ 06:55), Max: 37.1 (01-23-18 @ 22:01)  HR: 69 (01-24-18 @ 06:55) (69 - 88)  BP: 150/105 (01-24-18 @ 06:55) (124/95 - 151/110)  RR: 18 (01-24-18 @ 06:55) (18 - 18)  SpO2: 100% (01-24-18 @ 06:01) (100% - 100%)  Wt(kg): --        Physical Exam:  	Gen: NAD, well-appearing thin male  	Pulm: CTA B/L  	CV: RRR, S1S2; no rub  	Abd: +BS, soft, nontender/nondistended  	UE: Warm, no edema  	LE: Warm, no edema  	Neuro: No focal deficits  	Psych: Normal affect and mood  	Skin: Warm, without rashes  	Vascular access: right tunnelled HD catheter - no erythema or drainage noted    LABS/STUDIES  --------------------------------------------------------------------------------              9.8    4.13  >-----------<  153      [01-24-18 @ 06:55]              29.4     140  |  100  |  46  ----------------------------<  89      [01-24-18 @ 06:55]  4.3   |  26  |  5.95        Ca     9.2     [01-24-18 @ 06:55]      Mg     2.6     [01-24-18 @ 06:55]      Phos  5.0     [01-24-18 @ 06:55]            Creatinine Trend:  SCr 5.95 [01-24 @ 06:55]  SCr 4.22 [01-23 @ 05:10]  SCr 6.07 [01-22 @ 15:50]  SCr 5.18 [01-21 @ 06:50]  SCr 6.03 [01-20 @ 04:30]        Iron 98, TIBC 219, %sat --      [01-20-18 @ 14:00]  Ferritin 813.9      [01-20-18 @ 14:00]    HBsAg NEGATIVE      [01-20-18 @ 11:30]  HCV 0.07, Nonreactive Hepatitis C AB  S/CO Ratio                        Interpretation  < 1.0                                     Non-Reactive  1.0 - 4.9                           Weakly-Reactive  > 5.0                                 Reactive  Non-Reactive: Aperson with a non-reactive HCV antibody  result is considered uninfected.  No further action is  needed unless recent infection is suspected.  In these  cases, consider repeat testing later to detect  seroconversion..  Weakly-Reactive: HCV antibody test is abnormal, HCV RNA  Qualitative test will follow.  Reactive: HCV antibody test is abnormal, HCV RNA  Qualitative test will follow.  Note: HCV antibody testing is performed on the [employer]   system.      [01-20-18 @ 09:00]

## 2018-01-24 NOTE — PROGRESS NOTE ADULT - SUBJECTIVE AND OBJECTIVE BOX
Patient is a 52y old  Male who presents with a chief complaint of SOB, missed dialysis (20 Jan 2018 09:41)      SUBJECTIVE / OVERNIGHT EVENTS:  Pt feels well, no complaints, ambulates     MEDICATIONS  (STANDING):  amLODIPine   Tablet 10 milliGRAM(s) Oral daily  atorvastatin 40 milliGRAM(s) Oral at bedtime  heparin  Injectable 5000 Unit(s) SubCutaneous every 8 hours  lisinopril 40 milliGRAM(s) Oral daily  Nephro-shavon 1 Tablet(s) Oral daily  sevelamer hydrochloride 1600 milliGRAM(s) Oral three times a day with meals  vitamin B complex with vitamin C 1 Tablet(s) Oral daily    MEDICATIONS  (PRN):      T(C): 36.8 (01-24-18 @ 12:59), Max: 37.1 (01-23-18 @ 22:01)  HR: 85 (01-24-18 @ 12:59) (69 - 85)  BP: 146/94 (01-24-18 @ 12:59) (123/90 - 151/110)  RR: 18 (01-24-18 @ 12:59) (18 - 18)  SpO2: 99% (01-24-18 @ 12:59) (99% - 100%)  CAPILLARY BLOOD GLUCOSE        I&O's Summary    24 Jan 2018 07:01  -  24 Jan 2018 16:49  --------------------------------------------------------  IN: 400 mL / OUT: 2900 mL / NET: -2500 mL        PHYSICAL EXAM:  GENERAL: NAD, well-developed  HEAD:  Atraumatic, Normocephalic  EYES: EOMI, PERRLA, conjunctiva and sclera clear  NECK: Supple, No JVD  CHEST/LUNG: Clear to auscultation bilaterally; No wheeze  HEART: s1 s2, regular rhythm and rate   ABDOMEN: Soft, Nontender, Nondistended; Bowel sounds present  EXTREMITIES:  2+ Peripheral Pulses, No clubbing, cyanosis, or edema  PSYCH: AAOx3, calm   NEUROLOGY: non-focal  SKIN: No rashes or lesions, permacath in right chest wall     LABS:                        9.8    4.13  )-----------( 153      ( 24 Jan 2018 06:55 )             29.4     01-24    140  |  100  |  46<H>  ----------------------------<  89  4.3   |  26  |  5.95<H>    Ca    9.2      24 Jan 2018 06:55  Phos  5.0     01-24  Mg     2.6     01-24                RADIOLOGY & ADDITIONAL TESTS:    Imaging Personally Reviewed:    Consultant(s) Notes Reviewed:      Care Discussed with Consultants/Other Providers:

## 2018-01-25 PROCEDURE — 99232 SBSQ HOSP IP/OBS MODERATE 35: CPT

## 2018-01-25 RX ADMIN — SEVELAMER CARBONATE 1600 MILLIGRAM(S): 2400 POWDER, FOR SUSPENSION ORAL at 08:43

## 2018-01-25 RX ADMIN — ATORVASTATIN CALCIUM 40 MILLIGRAM(S): 80 TABLET, FILM COATED ORAL at 22:20

## 2018-01-25 RX ADMIN — LISINOPRIL 40 MILLIGRAM(S): 2.5 TABLET ORAL at 06:27

## 2018-01-25 RX ADMIN — HEPARIN SODIUM 5000 UNIT(S): 5000 INJECTION INTRAVENOUS; SUBCUTANEOUS at 06:26

## 2018-01-25 RX ADMIN — Medication 1 TABLET(S): at 12:47

## 2018-01-25 RX ADMIN — SEVELAMER CARBONATE 1600 MILLIGRAM(S): 2400 POWDER, FOR SUSPENSION ORAL at 12:47

## 2018-01-25 RX ADMIN — SEVELAMER CARBONATE 1600 MILLIGRAM(S): 2400 POWDER, FOR SUSPENSION ORAL at 17:25

## 2018-01-25 RX ADMIN — AMLODIPINE BESYLATE 10 MILLIGRAM(S): 2.5 TABLET ORAL at 06:27

## 2018-01-25 NOTE — PROGRESS NOTE ADULT - SUBJECTIVE AND OBJECTIVE BOX
Patient is a 52y old  Male who presents with a chief complaint of SOB, missed dialysis (20 Jan 2018 09:41)      SUBJECTIVE / OVERNIGHT EVENTS:  Pt feels well, no complaints.     MEDICATIONS  (STANDING):  amLODIPine   Tablet 10 milliGRAM(s) Oral daily  atorvastatin 40 milliGRAM(s) Oral at bedtime  heparin  Injectable 5000 Unit(s) SubCutaneous every 8 hours  lisinopril 40 milliGRAM(s) Oral daily  Nephro-shavon 1 Tablet(s) Oral daily  sevelamer hydrochloride 1600 milliGRAM(s) Oral three times a day with meals  vitamin B complex with vitamin C 1 Tablet(s) Oral daily    MEDICATIONS  (PRN):      T(C): 37 (01-25-18 @ 14:30), Max: 37 (01-25-18 @ 14:30)  HR: 65 (01-25-18 @ 14:30) (65 - 77)  BP: 120/90 (01-25-18 @ 14:30) (120/90 - 152/105)  RR: 18 (01-25-18 @ 14:30) (18 - 18)  SpO2: 100% (01-25-18 @ 14:30) (100% - 100%)  CAPILLARY BLOOD GLUCOSE        I&O's Summary    24 Jan 2018 07:01  -  25 Jan 2018 07:00  --------------------------------------------------------  IN: 400 mL / OUT: 2900 mL / NET: -2500 mL        PHYSICAL EXAM:  GENERAL: NAD, well-developed  HEAD:  Atraumatic, Normocephalic  EYES: EOMI, PERRLA, conjunctiva and sclera clear  NECK: Supple, No JVD  CHEST/LUNG: Clear to auscultation bilaterally; No wheeze  HEART: s1 s2, regular rhythm and rate   ABDOMEN: Soft, Nontender, Nondistended; Bowel sounds present  EXTREMITIES:  2+ Peripheral Pulses, No clubbing, cyanosis, or edema  PSYCH: AAOx3, calm   NEUROLOGY: non-focal  SKIN: No rashes or lesions    LABS:                        9.8    4.13  )-----------( 153      ( 24 Jan 2018 06:55 )             29.4     01-24    140  |  100  |  46<H>  ----------------------------<  89  4.3   |  26  |  5.95<H>    Ca    9.2      24 Jan 2018 06:55  Phos  5.0     01-24  Mg     2.6     01-24                RADIOLOGY & ADDITIONAL TESTS:    Imaging Personally Reviewed:    Consultant(s) Notes Reviewed:      Care Discussed with Consultants/Other Providers: Patient is a 52y old  Male who presents with a chief complaint of SOB, missed dialysis (20 Jan 2018 09:41)      SUBJECTIVE / OVERNIGHT EVENTS:  Pt feels well, no complaints.     MEDICATIONS  (STANDING):  amLODIPine   Tablet 10 milliGRAM(s) Oral daily  atorvastatin 40 milliGRAM(s) Oral at bedtime  heparin  Injectable 5000 Unit(s) SubCutaneous every 8 hours  lisinopril 40 milliGRAM(s) Oral daily  Nephro-shavon 1 Tablet(s) Oral daily  sevelamer hydrochloride 1600 milliGRAM(s) Oral three times a day with meals  vitamin B complex with vitamin C 1 Tablet(s) Oral daily    MEDICATIONS  (PRN):      T(C): 37 (01-25-18 @ 14:30), Max: 37 (01-25-18 @ 14:30)  HR: 65 (01-25-18 @ 14:30) (65 - 77)  BP: 120/90 (01-25-18 @ 14:30) (120/90 - 152/105)  RR: 18 (01-25-18 @ 14:30) (18 - 18)  SpO2: 100% (01-25-18 @ 14:30) (100% - 100%)  CAPILLARY BLOOD GLUCOSE        I&O's Summary    24 Jan 2018 07:01  -  25 Jan 2018 07:00  --------------------------------------------------------  IN: 400 mL / OUT: 2900 mL / NET: -2500 mL        PHYSICAL EXAM:  GENERAL: NAD, well-developed  HEAD:  Atraumatic, Normocephalic  EYES: EOMI, PERRLA, conjunctiva and sclera clear  NECK: Supple, No JVD  CHEST/LUNG: Clear to auscultation bilaterally; No wheeze  HEART: s1 s2, regular rhythm and rate   ABDOMEN: Soft, Nontender, Nondistended; Bowel sounds present  EXTREMITIES:  2+ Peripheral Pulses, No clubbing, cyanosis, or edema  PSYCH: AAOx3, flat affect  NEUROLOGY: non-focal  SKIN: No rashes or lesions, permacath in right chest wall     LABS:                        9.8    4.13  )-----------( 153      ( 24 Jan 2018 06:55 )             29.4     01-24    140  |  100  |  46<H>  ----------------------------<  89  4.3   |  26  |  5.95<H>    Ca    9.2      24 Jan 2018 06:55  Phos  5.0     01-24  Mg     2.6     01-24                RADIOLOGY & ADDITIONAL TESTS:    Imaging Personally Reviewed:    Consultant(s) Notes Reviewed:      Care Discussed with Consultants/Other Providers:

## 2018-01-26 LAB
BASOPHILS # BLD AUTO: 0.04 K/UL — SIGNIFICANT CHANGE UP (ref 0–0.2)
BASOPHILS NFR BLD AUTO: 0.9 % — SIGNIFICANT CHANGE UP (ref 0–2)
BUN SERPL-MCNC: 61 MG/DL — HIGH (ref 7–23)
CALCIUM SERPL-MCNC: 9.4 MG/DL — SIGNIFICANT CHANGE UP (ref 8.4–10.5)
CHLORIDE SERPL-SCNC: 97 MMOL/L — LOW (ref 98–107)
CO2 SERPL-SCNC: 22 MMOL/L — SIGNIFICANT CHANGE UP (ref 22–31)
CREAT SERPL-MCNC: 6.63 MG/DL — HIGH (ref 0.5–1.3)
EOSINOPHIL # BLD AUTO: 0.15 K/UL — SIGNIFICANT CHANGE UP (ref 0–0.5)
EOSINOPHIL NFR BLD AUTO: 3.3 % — SIGNIFICANT CHANGE UP (ref 0–6)
GLUCOSE SERPL-MCNC: 90 MG/DL — SIGNIFICANT CHANGE UP (ref 70–99)
HCT VFR BLD CALC: 30.2 % — LOW (ref 39–50)
HGB BLD-MCNC: 10.1 G/DL — LOW (ref 13–17)
IMM GRANULOCYTES # BLD AUTO: 0.01 # — SIGNIFICANT CHANGE UP
IMM GRANULOCYTES NFR BLD AUTO: 0.2 % — SIGNIFICANT CHANGE UP (ref 0–1.5)
LYMPHOCYTES # BLD AUTO: 1.16 K/UL — SIGNIFICANT CHANGE UP (ref 1–3.3)
LYMPHOCYTES # BLD AUTO: 25.4 % — SIGNIFICANT CHANGE UP (ref 13–44)
MCHC RBC-ENTMCNC: 31.1 PG — SIGNIFICANT CHANGE UP (ref 27–34)
MCHC RBC-ENTMCNC: 33.4 % — SIGNIFICANT CHANGE UP (ref 32–36)
MCV RBC AUTO: 92.9 FL — SIGNIFICANT CHANGE UP (ref 80–100)
MONOCYTES # BLD AUTO: 0.39 K/UL — SIGNIFICANT CHANGE UP (ref 0–0.9)
MONOCYTES NFR BLD AUTO: 8.5 % — SIGNIFICANT CHANGE UP (ref 2–14)
NEUTROPHILS # BLD AUTO: 2.82 K/UL — SIGNIFICANT CHANGE UP (ref 1.8–7.4)
NEUTROPHILS NFR BLD AUTO: 61.7 % — SIGNIFICANT CHANGE UP (ref 43–77)
NRBC # FLD: 0 — SIGNIFICANT CHANGE UP
PLATELET # BLD AUTO: 164 K/UL — SIGNIFICANT CHANGE UP (ref 150–400)
PMV BLD: 12.6 FL — SIGNIFICANT CHANGE UP (ref 7–13)
POTASSIUM SERPL-MCNC: 4.2 MMOL/L — SIGNIFICANT CHANGE UP (ref 3.5–5.3)
POTASSIUM SERPL-SCNC: 4.2 MMOL/L — SIGNIFICANT CHANGE UP (ref 3.5–5.3)
RBC # BLD: 3.25 M/UL — LOW (ref 4.2–5.8)
RBC # FLD: 12.7 % — SIGNIFICANT CHANGE UP (ref 10.3–14.5)
SODIUM SERPL-SCNC: 136 MMOL/L — SIGNIFICANT CHANGE UP (ref 135–145)
WBC # BLD: 4.57 K/UL — SIGNIFICANT CHANGE UP (ref 3.8–10.5)
WBC # FLD AUTO: 4.57 K/UL — SIGNIFICANT CHANGE UP (ref 3.8–10.5)

## 2018-01-26 PROCEDURE — 90935 HEMODIALYSIS ONE EVALUATION: CPT

## 2018-01-26 PROCEDURE — 99232 SBSQ HOSP IP/OBS MODERATE 35: CPT

## 2018-01-26 RX ADMIN — LISINOPRIL 40 MILLIGRAM(S): 2.5 TABLET ORAL at 17:44

## 2018-01-26 RX ADMIN — Medication 1 TABLET(S): at 13:00

## 2018-01-26 RX ADMIN — ATORVASTATIN CALCIUM 40 MILLIGRAM(S): 80 TABLET, FILM COATED ORAL at 21:25

## 2018-01-26 RX ADMIN — SEVELAMER CARBONATE 1600 MILLIGRAM(S): 2400 POWDER, FOR SUSPENSION ORAL at 13:00

## 2018-01-26 RX ADMIN — SEVELAMER CARBONATE 1600 MILLIGRAM(S): 2400 POWDER, FOR SUSPENSION ORAL at 17:44

## 2018-01-26 NOTE — CONSULT NOTE ADULT - ASSESSMENT
52 year old male with h/o ESRD on HD(MWF), HTN admitted for SOB and missed dialysis session; last HD was 1 week ago.
52 year old man presents after not having had dialysis for a week and a half. He is currently getting dialyzed through a right sided catheter. He is currently refusing AVF.    Plan: LUE aarti  b/l vein mapping  medical clearance for AVF if patient changes his mind    - d/w Dr. Alvaro Crooks, PGY2  # [phone]

## 2018-01-26 NOTE — PROGRESS NOTE ADULT - SUBJECTIVE AND OBJECTIVE BOX
[facility] DIVISION OF KIDNEY DISEASES AND HYPERTENSION --   --------------------------------------------------------------------------------  Chief Complaint: ESRD/Ongoing hemodialysis requirement    HPI:  Patient is a 52 year old male with ESRD on HD MWF, and HTN who presented with SOB in setting of missed dialysis for 1 week. Patient has history of ESRD secondary to HTN and has been on HD for the past 1 year. Patient went to dialysis center in the [place] where his shelter was, but moved to a new shelter in [place] without having his HD set up. Patient has right tunneled HD catheter which was placed 1 year ago. Pt. seen and examined during HD today. Pt. feels well and denies CP, SOB, dizziness or HA.    PAST HISTORY  --------------------------------------------------------------------------------  No significant changes to PMH, PSH, FHx, SHx, unless otherwise noted    ALLERGIES & MEDICATIONS  --------------------------------------------------------------------------------  Allergies    No Known Allergies    Intolerances    Standing Inpatient Medications  amLODIPine   Tablet 10 milliGRAM(s) Oral daily  atorvastatin 40 milliGRAM(s) Oral at bedtime  heparin  Injectable 5000 Unit(s) SubCutaneous every 8 hours  lisinopril 40 milliGRAM(s) Oral daily  Nephro-shavon 1 Tablet(s) Oral daily  sevelamer hydrochloride 1600 milliGRAM(s) Oral three times a day with meals  vitamin B complex with vitamin C 1 Tablet(s) Oral daily    PRN Inpatient Medications    REVIEW OF SYSTEMS  --------------------------------------------------------------------------------  Gen: No fever  Respiratory: No dyspnea  CV: No chest pain  GI: No abdominal pain  Neuro: No dizziness    VITALS/PHYSICAL EXAM  --------------------------------------------------------------------------------  T(C): 36.5 (01-26-18 @ 06:45), Max: 37 (01-25-18 @ 14:30)  HR: 64 (01-26-18 @ 06:45) (64 - 66)  BP: 138/79 (01-26-18 @ 06:45) (120/90 - 141/90)  RR: 18 (01-26-18 @ 06:45) (17 - 18)  SpO2: 100% (01-26-18 @ 05:22) (100% - 100%)  Wt(kg): --    Physical Exam:  Gen: resting, NAD  	Pulm: fair air entry B/L  	CV: S1S2+  	Abd: Soft, NT   	Ext: No edema present  	Neuro: Awake  	Vascular access: Right IJ tunneled HD catheter: no bleeding/redness     LABS/STUDIES  --------------------------------------------------------------------------------              10.1   4.57  >-----------<  164      [01-26-18 @ 06:15]              30.2     136  |  97  |  61  ----------------------------<  90      [01-26-18 @ 06:15]  4.2   |  22  |  6.63        Ca     9.4     [01-26-18 @ 06:15]

## 2018-01-26 NOTE — PROVIDER CONTACT NOTE (OTHER) - BACKGROUND
Patient admitted for shortness of breath.
admitted for Shortness of breath
Admitted for sob and hypertensive urgency
Patient admitted with shortness of breath. Patient has history of anemia, htn, esrd.
admitted for Shortness of breath
patient admitted with shortness of breath. patient has history of anemia, htn, ESRD.

## 2018-01-26 NOTE — PROVIDER CONTACT NOTE (OTHER) - REASON
Patients blood pressure elevated
Patients blood pressure elevated
hypertension
repeat manual 176/116 HR 74
/108 HR 72
Blood Pressure Medications before hemodialysis.

## 2018-01-26 NOTE — PROVIDER CONTACT NOTE (OTHER) - ACTION/TREATMENT ORDERED:
cont to monitor
blood pressure medications held.
admin AM BP meds. no IV HTN meds as pt may go to dialysis. cont to monitor
Give due BP medications; reassess in 1-2 hours. Continue to monitor patient.
Hold AM medications prior to dialysis and reschedule for afternoon when patient returns from dialysis. Continue to monitor patient.
Patient scheduled for HD this afternoon; will patrick to see if he can be dialized earlier today. Will continue to monitor vital signs.

## 2018-01-26 NOTE — PROVIDER CONTACT NOTE (OTHER) - SITUATION
Patient's blood pressure remains high; 174/106
Patients blood pressure elevated 156/110
Patients blood pressure elevated to 151/110. Patient going to dialysis 6:30 AM.
repeat manual 176/116 HR 74 palpated.
/108 HR 72 manually
Patient going for hemodialysis at 0600 on 1/26/18. Patient scheduled for norvasc and lisinopril

## 2018-01-26 NOTE — CONSULT NOTE ADULT - SUBJECTIVE AND OBJECTIVE BOX
[facility] Division of Kidney Diseases & Hypertension  INITIAL CONSULT NOTE  [phone]--------------------------------------------------------------------------------  HPI:    52 year old male with h/o ESRD on HD(MWF), HTN presented to ER with complaints of SOB and missed dialysis. Patient has history of ESRD secondary to HTN and has been on HD for the past 1 year. Patient used to live in shelter in [place] and went to dialysis center in [place] but does not know the name of dialysis center or the name of his nephrologist. He recently moved to [facility] in [place] and therefore has missed dialysis for the past 1 week. Patient has right tunneled HD catheter which was placed 1 year ago. Currently patient has complains of SOB and mild cough but denies complains of chest pain, fevers, abdominal pain, nausea, vomiting.     PAST HISTORY  --------------------------------------------------------------------------------  PAST MEDICAL & SURGICAL HISTORY:  ESRD (end stage renal disease)  HTN (hypertension)  No significant past surgical history    FAMILY HISTORY:  No pertinent family history in first degree relatives    PAST SOCIAL HISTORY:    ALLERGIES & MEDICATIONS  --------------------------------------------------------------------------------  Allergies    No Known Allergies    Intolerances      Standing Inpatient Medications  atorvastatin 40 milliGRAM(s) Oral at bedtime  heparin  Injectable 5000 Unit(s) SubCutaneous every 8 hours  lisinopril 40 milliGRAM(s) Oral daily  Nephro-shavon 1 Tablet(s) Oral daily  sevelamer hydrochloride 1600 milliGRAM(s) Oral three times a day with meals  vitamin B complex with vitamin C 1 Tablet(s) Oral daily    PRN Inpatient Medications      REVIEW OF SYSTEMS  --------------------------------------------------------------------------------  Gen: No  fevers/chills  Skin: No rashes  Head/Eyes/Ears/Mouth: No headache  Respiratory:SOB and cough +  CV: No chest pain,   GI: No abdominal pain, nausea, vomiting  : No increased frequency, dysuria, hematuria, nocturia  MSK: No joint pain/swelling;   Neuro: No dizziness/lightheadedness, weakness, seizures    All other systems were reviewed and are negative, except as noted.    VITALS/PHYSICAL EXAM  --------------------------------------------------------------------------------  T(C): 36.8 (01-20-18 @ 07:01), Max: 37.1 (01-20-18 @ 02:54)  HR: 72 (01-20-18 @ 09:33) (70 - 75)  BP: 163/107 (01-20-18 @ 09:33) (163/107 - 224/144)  RR: 17 (01-20-18 @ 09:33) (15 - 20)  SpO2: 100% (01-20-18 @ 09:33) (100% - 100%)  Wt(kg): --        Physical Exam:  	Gen: NAD  	HEENT: no JVD, sclera anicteric   	Pulm: CTA B/L  	CV:  S1S2  	Abd: +BS, soft   	Ext: No B/L Lower ext edema  	Neuro: No focal deficits              Psych: normal mood and affect.   	Skin: Warm and dry   	Vascular access: Right tunneled HD catheter present    LABS/STUDIES  --------------------------------------------------------------------------------              9.5    5.20  >-----------<  131      [01-20-18 @ 04:30]              28.6     141  |  106  |  65  ----------------------------<  98      [01-20-18 @ 04:30]  4.8   |  20  |  6.03        Ca     8.7     [01-20-18 @ 04:30]    TPro  6.6  /  Alb  4.0  /  TBili  0.4  /  DBili  x   /  AST  12  /  ALT  16  /  AlkPhos  41  [01-20-18 @ 04:30]          Creatinine Trend:  SCr 6.03 [01-20 @ 04:30]
CC: Patient is a 52y old  Male who presents with a chief complaint of SOB, missed dialysis (20 Jan 2018 09:41)    Patient is a 52y year old male with PMHx of ESRD currently getting dialysis through a Permacath presented after missing dialysis for a week and a half with "high blood pressure and shortness of breath". He states he lived in [place] and recently moved to [place]. He was getting dialyzed at a center in the [place] when they told him he's "discharged". He states that he got his Permacath a year ago in [place]. He doesn't remember who gave it to him. He is currently homeless and without a shelter. (Medicine team and social work are working on disposition and establishing dialysis care).  ROS: he does not complain of leg pain, rest pain, difficulty walker or non healing wounds    He was offered a arteriovenous fistula and is currently refusing. He states that he doesn't want the large bump on his arms. He was explained that he could get a severe infection leading to bacteremia, hospitalization and possibly ICU stay. He is currently refusing and stating he doesn't even want to think about it.   He is right handed.        PMH  ESRD (end stage renal disease)  HTN (hypertension)  DM (diabetes mellitus)  No pertinent past medical history    PSH  No significant past surgical history    MEDS  MEDICATIONS  (STANDING):  amLODIPine   Tablet 10 milliGRAM(s) Oral daily  atorvastatin 40 milliGRAM(s) Oral at bedtime  heparin  Injectable 5000 Unit(s) SubCutaneous every 8 hours  lisinopril 40 milliGRAM(s) Oral daily  Nephro-shavon 1 Tablet(s) Oral daily  sevelamer hydrochloride 1600 milliGRAM(s) Oral three times a day with meals  vitamin B complex with vitamin C 1 Tablet(s) Oral daily    Allergies    No Known Allergies    Intolerances        Social Patient states he does not smoke or drink alcohol.         Physical Exam  T(C): 36.3 (01-26-18 @ 12:57), Max: 37 (01-26-18 @ 05:22)  HR: 76 (01-26-18 @ 12:57) (64 - 78)  BP: 101/67 (01-26-18 @ 12:57) (101/67 - 141/90)  RR: 18 (01-26-18 @ 12:57) (17 - 18)  SpO2: 100% (01-26-18 @ 12:57) (100% - 100%)  Wt(kg): --  Tmax: T(C): , Max: 37 (01-26-18 @ 05:22)  Wt(kg): --    Gen: NAD  HEENT: normocephalic, atraumatic, no scleral icterus  CV: S1, S2, RRR  Pulm: CTA B/L  Abd: Soft, ND, NTP, no rebound, no guarding, no palpable organomegaly/masses  Ext: warm, no edema, palp dp/pt  palpable ulnar and radial pulses b/l, IV in Left forearm    01-26-18  -  01-26-18  --------------------------------------------------------  IN:    Other: 400 mL  Total IN: 400 mL    OUT:    Other: 2900 mL  Total OUT: 2900 mL    Total NET: -2500 mL          Labs:                        10.1   4.57  )-----------( 164      ( 26 Jan 2018 06:15 )             30.2     01-26    136  |  97<L>  |  61<H>  ----------------------------<  90  4.2   |  22  |  6.63<H>    Ca    9.4      26 Jan 2018 06:15

## 2018-01-26 NOTE — PROGRESS NOTE ADULT - SUBJECTIVE AND OBJECTIVE BOX
Patient is a 52y old  Male who presents with a chief complaint of SOB, missed dialysis (20 Jan 2018 09:41)      SUBJECTIVE / OVERNIGHT EVENTS:  Pt is doing well, ambulating, no complaints.     MEDICATIONS  (STANDING):  amLODIPine   Tablet 10 milliGRAM(s) Oral daily  atorvastatin 40 milliGRAM(s) Oral at bedtime  heparin  Injectable 5000 Unit(s) SubCutaneous every 8 hours  lisinopril 40 milliGRAM(s) Oral daily  Nephro-shavon 1 Tablet(s) Oral daily  sevelamer hydrochloride 1600 milliGRAM(s) Oral three times a day with meals  vitamin B complex with vitamin C 1 Tablet(s) Oral daily    MEDICATIONS  (PRN):      T(C): 36.3 (01-26-18 @ 12:57), Max: 37 (01-26-18 @ 05:22)  HR: 76 (01-26-18 @ 12:57) (64 - 78)  BP: 101/67 (01-26-18 @ 12:57) (101/67 - 141/90)  RR: 18 (01-26-18 @ 12:57) (17 - 18)  SpO2: 100% (01-26-18 @ 12:57) (100% - 100%)  CAPILLARY BLOOD GLUCOSE        I&O's Summary    26 Jan 2018 07:01  -  26 Jan 2018 16:17  --------------------------------------------------------  IN: 400 mL / OUT: 2900 mL / NET: -2500 mL        PHYSICAL EXAM:  GENERAL: NAD, well-developed  HEAD:  Atraumatic, Normocephalic  EYES: EOMI, PERRLA, conjunctiva and sclera clear  NECK: Supple, No JVD  CHEST/LUNG: Clear to auscultation bilaterally; No wheeze  HEART: s1 s2, regular rhythm and rate   ABDOMEN: Soft, Nontender, Nondistended; Bowel sounds present  EXTREMITIES:  2+ Peripheral Pulses, No clubbing, cyanosis, or edema  PSYCH: AAOx3, calm   NEUROLOGY: non-focal  SKIN: No rashes or lesions    LABS:                        10.1   4.57  )-----------( 164      ( 26 Jan 2018 06:15 )             30.2     01-26    136  |  97<L>  |  61<H>  ----------------------------<  90  4.2   |  22  |  6.63<H>    Ca    9.4      26 Jan 2018 06:15                RADIOLOGY & ADDITIONAL TESTS:    Imaging Personally Reviewed:    Consultant(s) Notes Reviewed:      Care Discussed with Consultants/Other Providers: Patient is a 52y old  Male who presents with a chief complaint of SOB, missed dialysis (20 Jan 2018 09:41)      SUBJECTIVE / OVERNIGHT EVENTS:  Pt is doing well, ambulating, no complaints.     MEDICATIONS  (STANDING):  amLODIPine   Tablet 10 milliGRAM(s) Oral daily  atorvastatin 40 milliGRAM(s) Oral at bedtime  heparin  Injectable 5000 Unit(s) SubCutaneous every 8 hours  lisinopril 40 milliGRAM(s) Oral daily  Nephro-shavon 1 Tablet(s) Oral daily  sevelamer hydrochloride 1600 milliGRAM(s) Oral three times a day with meals  vitamin B complex with vitamin C 1 Tablet(s) Oral daily    MEDICATIONS  (PRN):      T(C): 36.3 (01-26-18 @ 12:57), Max: 37 (01-26-18 @ 05:22)  HR: 76 (01-26-18 @ 12:57) (64 - 78)  BP: 101/67 (01-26-18 @ 12:57) (101/67 - 141/90)  RR: 18 (01-26-18 @ 12:57) (17 - 18)  SpO2: 100% (01-26-18 @ 12:57) (100% - 100%)  CAPILLARY BLOOD GLUCOSE        I&O's Summary    26 Jan 2018 07:01  -  26 Jan 2018 16:17  --------------------------------------------------------  IN: 400 mL / OUT: 2900 mL / NET: -2500 mL        GENERAL: NAD, well-developed  HEAD:  Atraumatic, Normocephalic  EYES: EOMI, PERRLA, conjunctiva and sclera clear  NECK: Supple, No JVD  CHEST/LUNG: Clear to auscultation bilaterally; No wheeze  HEART: s1 s2, regular rhythm and rate   ABDOMEN: Soft, Nontender, Nondistended; Bowel sounds present  EXTREMITIES:  2+ Peripheral Pulses, No clubbing, cyanosis, or edema  PSYCH: AAOx3, flat affect  NEUROLOGY: non-focal  SKIN: No rashes or lesions, permacath in right chest wall     LABS:                        10.1   4.57  )-----------( 164      ( 26 Jan 2018 06:15 )             30.2     01-26    136  |  97<L>  |  61<H>  ----------------------------<  90  4.2   |  22  |  6.63<H>    Ca    9.4      26 Jan 2018 06:15                RADIOLOGY & ADDITIONAL TESTS:    Imaging Personally Reviewed:    Consultant(s) Notes Reviewed:      Care Discussed with Consultants/Other Providers:

## 2018-01-26 NOTE — PROVIDER CONTACT NOTE (OTHER) - RECOMMENDATIONS
hold blood pressure medications before dialysis.
Give due medications; give additional medications; reassess blood pressure in 1-2 hours; continue to monitor patient.
HD
Hold AM Blood pressure meds for dialysis; reschedule to afternoon; give medications; continue to monitor

## 2018-01-26 NOTE — PROVIDER CONTACT NOTE (OTHER) - DATE AND TIME:
22-Jan-2018 05:53
22-Jan-2018 09:15
23-Jan-2018 06:49
24-Jan-2018 06:11
21-Jan-2018 22:05
26-Jan-2018 05:26

## 2018-01-26 NOTE — PROVIDER CONTACT NOTE (OTHER) - ASSESSMENT
/108 HR 72 manually. pt is resting in bed at the moment. no s/s of acute distress noted.
Patient alert and oriented to baseline. No s/s of distress noted. Patient denies headache. Blood pressure 138/100. HR 66. Temp 98.6. Pulse ox 100% on room air.
No changes at this time
Patient is alert and oriented. asymptomatic.
Patient is alert and oriented. asymptomatic. slightly agitated upon wake up for AM medications.
repeat manual 176/116 HR 74 palpated. pt resting comfortably in bed at this time. denies s/s of an acute distress.

## 2018-01-27 LAB
BASOPHILS # BLD AUTO: 0.03 K/UL — SIGNIFICANT CHANGE UP (ref 0–0.2)
BASOPHILS NFR BLD AUTO: 0.6 % — SIGNIFICANT CHANGE UP (ref 0–2)
BUN SERPL-MCNC: 50 MG/DL — HIGH (ref 7–23)
CALCIUM SERPL-MCNC: 9.2 MG/DL — SIGNIFICANT CHANGE UP (ref 8.4–10.5)
CHLORIDE SERPL-SCNC: 95 MMOL/L — LOW (ref 98–107)
CO2 SERPL-SCNC: 23 MMOL/L — SIGNIFICANT CHANGE UP (ref 22–31)
CREAT SERPL-MCNC: 6.13 MG/DL — HIGH (ref 0.5–1.3)
EOSINOPHIL # BLD AUTO: 0.15 K/UL — SIGNIFICANT CHANGE UP (ref 0–0.5)
EOSINOPHIL NFR BLD AUTO: 3.1 % — SIGNIFICANT CHANGE UP (ref 0–6)
GLUCOSE SERPL-MCNC: 92 MG/DL — SIGNIFICANT CHANGE UP (ref 70–99)
HCT VFR BLD CALC: 32.2 % — LOW (ref 39–50)
HGB BLD-MCNC: 10.9 G/DL — LOW (ref 13–17)
IMM GRANULOCYTES # BLD AUTO: 0.01 # — SIGNIFICANT CHANGE UP
IMM GRANULOCYTES NFR BLD AUTO: 0.2 % — SIGNIFICANT CHANGE UP (ref 0–1.5)
LYMPHOCYTES # BLD AUTO: 1.41 K/UL — SIGNIFICANT CHANGE UP (ref 1–3.3)
LYMPHOCYTES # BLD AUTO: 29.4 % — SIGNIFICANT CHANGE UP (ref 13–44)
MCHC RBC-ENTMCNC: 31.1 PG — SIGNIFICANT CHANGE UP (ref 27–34)
MCHC RBC-ENTMCNC: 33.9 % — SIGNIFICANT CHANGE UP (ref 32–36)
MCV RBC AUTO: 92 FL — SIGNIFICANT CHANGE UP (ref 80–100)
MONOCYTES # BLD AUTO: 0.47 K/UL — SIGNIFICANT CHANGE UP (ref 0–0.9)
MONOCYTES NFR BLD AUTO: 9.8 % — SIGNIFICANT CHANGE UP (ref 2–14)
NEUTROPHILS # BLD AUTO: 2.73 K/UL — SIGNIFICANT CHANGE UP (ref 1.8–7.4)
NEUTROPHILS NFR BLD AUTO: 56.9 % — SIGNIFICANT CHANGE UP (ref 43–77)
NRBC # FLD: 0 — SIGNIFICANT CHANGE UP
PLATELET # BLD AUTO: 170 K/UL — SIGNIFICANT CHANGE UP (ref 150–400)
PMV BLD: 12.6 FL — SIGNIFICANT CHANGE UP (ref 7–13)
POTASSIUM SERPL-MCNC: 4 MMOL/L — SIGNIFICANT CHANGE UP (ref 3.5–5.3)
POTASSIUM SERPL-SCNC: 4 MMOL/L — SIGNIFICANT CHANGE UP (ref 3.5–5.3)
RBC # BLD: 3.5 M/UL — LOW (ref 4.2–5.8)
RBC # FLD: 12.8 % — SIGNIFICANT CHANGE UP (ref 10.3–14.5)
SODIUM SERPL-SCNC: 136 MMOL/L — SIGNIFICANT CHANGE UP (ref 135–145)
WBC # BLD: 4.8 K/UL — SIGNIFICANT CHANGE UP (ref 3.8–10.5)
WBC # FLD AUTO: 4.8 K/UL — SIGNIFICANT CHANGE UP (ref 3.8–10.5)

## 2018-01-27 PROCEDURE — 99221 1ST HOSP IP/OBS SF/LOW 40: CPT | Mod: GC,NC

## 2018-01-27 PROCEDURE — 99232 SBSQ HOSP IP/OBS MODERATE 35: CPT

## 2018-01-27 RX ORDER — POLYETHYLENE GLYCOL 3350 17 G/17G
17 POWDER, FOR SOLUTION ORAL AT BEDTIME
Qty: 0 | Refills: 0 | Status: DISCONTINUED | OUTPATIENT
Start: 2018-01-27 | End: 2018-01-29

## 2018-01-27 RX ORDER — DOCUSATE SODIUM 100 MG
100 CAPSULE ORAL THREE TIMES A DAY
Qty: 0 | Refills: 0 | Status: DISCONTINUED | OUTPATIENT
Start: 2018-01-27 | End: 2018-01-29

## 2018-01-27 RX ADMIN — SEVELAMER CARBONATE 1600 MILLIGRAM(S): 2400 POWDER, FOR SUSPENSION ORAL at 08:54

## 2018-01-27 RX ADMIN — AMLODIPINE BESYLATE 10 MILLIGRAM(S): 2.5 TABLET ORAL at 05:49

## 2018-01-27 RX ADMIN — Medication 1 TABLET(S): at 11:55

## 2018-01-27 RX ADMIN — ATORVASTATIN CALCIUM 40 MILLIGRAM(S): 80 TABLET, FILM COATED ORAL at 21:08

## 2018-01-27 RX ADMIN — SEVELAMER CARBONATE 1600 MILLIGRAM(S): 2400 POWDER, FOR SUSPENSION ORAL at 16:36

## 2018-01-27 RX ADMIN — Medication 1 TABLET(S): at 11:54

## 2018-01-27 RX ADMIN — Medication 100 MILLIGRAM(S): at 13:58

## 2018-01-27 RX ADMIN — SEVELAMER CARBONATE 1600 MILLIGRAM(S): 2400 POWDER, FOR SUSPENSION ORAL at 11:54

## 2018-01-27 RX ADMIN — LISINOPRIL 40 MILLIGRAM(S): 2.5 TABLET ORAL at 05:49

## 2018-01-27 NOTE — PROGRESS NOTE ADULT - SUBJECTIVE AND OBJECTIVE BOX
Patient is a 52y old  Male who presents with a chief complaint of SOB, missed dialysis (20 Jan 2018 09:41)      SUBJECTIVE / OVERNIGHT EVENTS:  doing well, no complaints.     MEDICATIONS  (STANDING):  amLODIPine   Tablet 10 milliGRAM(s) Oral daily  atorvastatin 40 milliGRAM(s) Oral at bedtime  docusate sodium 100 milliGRAM(s) Oral three times a day  heparin  Injectable 5000 Unit(s) SubCutaneous every 8 hours  lisinopril 40 milliGRAM(s) Oral daily  Nephro-shavon 1 Tablet(s) Oral daily  sevelamer hydrochloride 1600 milliGRAM(s) Oral three times a day with meals  vitamin B complex with vitamin C 1 Tablet(s) Oral daily    MEDICATIONS  (PRN):  polyethylene glycol 3350 17 Gram(s) Oral at bedtime PRN Constipation      T(C): 36.9 (01-27-18 @ 13:45), Max: 36.9 (01-27-18 @ 13:45)  HR: 68 (01-27-18 @ 13:45) (68 - 76)  BP: 96/58 (01-27-18 @ 13:45) (96/58 - 113/85)  RR: 16 (01-27-18 @ 13:45) (16 - 17)  SpO2: 100% (01-27-18 @ 13:45) (100% - 100%)  CAPILLARY BLOOD GLUCOSE        I&O's Summary    26 Jan 2018 07:01  -  27 Jan 2018 07:00  --------------------------------------------------------  IN: 400 mL / OUT: 2900 mL / NET: -2500 mL        PHYSICAL EXAM:  GENERAL: NAD, well-developed  HEAD:  Atraumatic, Normocephalic  EYES: EOMI, PERRLA, conjunctiva and sclera clear  NECK: Supple, No JVD  CHEST/LUNG: Clear to auscultation bilaterally; No wheeze  HEART: s1 s2, regular rhythm and rate   ABDOMEN: Soft, Nontender, Nondistended; Bowel sounds present  EXTREMITIES:  2+ Peripheral Pulses, No clubbing, cyanosis, or edema  PSYCH: AAOx3, flat affect  NEUROLOGY: non-focal  SKIN: + permacath in right chest wall    LABS:                        10.9   4.80  )-----------( 170      ( 27 Jan 2018 06:15 )             32.2     01-27    136  |  95<L>  |  50<H>  ----------------------------<  92  4.0   |  23  |  6.13<H>    Ca    9.2      27 Jan 2018 06:15                RADIOLOGY & ADDITIONAL TESTS:    Imaging Personally Reviewed:    Consultant(s) Notes Reviewed:      Care Discussed with Consultants/Other Providers:

## 2018-01-27 NOTE — CHART NOTE - NSCHARTNOTEFT_GEN_A_CORE
Patient seen this AM to have discussion about whether he reconsidered arteriovenous fistula.    He states he does not want it. He wants to go home.   The risks and benefits were again reiterated.    If patient changes his mind while inpatient, please call Vascular Surgery back.  If discharged, have him follow with Dr. John You may call [phone] to schedule an appointment.     - BUDDY Crooks, PGY2  # [id number]

## 2018-01-28 PROCEDURE — 99232 SBSQ HOSP IP/OBS MODERATE 35: CPT

## 2018-01-28 RX ADMIN — LISINOPRIL 40 MILLIGRAM(S): 2.5 TABLET ORAL at 06:29

## 2018-01-28 RX ADMIN — SEVELAMER CARBONATE 1600 MILLIGRAM(S): 2400 POWDER, FOR SUSPENSION ORAL at 13:12

## 2018-01-28 RX ADMIN — AMLODIPINE BESYLATE 10 MILLIGRAM(S): 2.5 TABLET ORAL at 06:29

## 2018-01-28 RX ADMIN — Medication 1 TABLET(S): at 13:11

## 2018-01-28 RX ADMIN — SEVELAMER CARBONATE 1600 MILLIGRAM(S): 2400 POWDER, FOR SUSPENSION ORAL at 09:20

## 2018-01-28 RX ADMIN — SEVELAMER CARBONATE 1600 MILLIGRAM(S): 2400 POWDER, FOR SUSPENSION ORAL at 17:53

## 2018-01-28 NOTE — PROGRESS NOTE ADULT - SUBJECTIVE AND OBJECTIVE BOX
Patient is a 52y old  Male who presents with a chief complaint of SOB, missed dialysis (20 Jan 2018 09:41)      SUBJECTIVE / OVERNIGHT EVENTS:  Pt feels well, ambulates, no complaints.     MEDICATIONS  (STANDING):  amLODIPine   Tablet 10 milliGRAM(s) Oral daily  atorvastatin 40 milliGRAM(s) Oral at bedtime  docusate sodium 100 milliGRAM(s) Oral three times a day  heparin  Injectable 5000 Unit(s) SubCutaneous every 8 hours  lisinopril 40 milliGRAM(s) Oral daily  Nephro-shavon 1 Tablet(s) Oral daily  sevelamer hydrochloride 1600 milliGRAM(s) Oral three times a day with meals  vitamin B complex with vitamin C 1 Tablet(s) Oral daily    MEDICATIONS  (PRN):  polyethylene glycol 3350 17 Gram(s) Oral at bedtime PRN Constipation      T(C): 36.8 (01-28-18 @ 15:16), Max: 36.9 (01-27-18 @ 20:53)  HR: 76 (01-28-18 @ 15:16) (63 - 76)  BP: 105/73 (01-28-18 @ 15:16) (101/69 - 150/98)  RR: 16 (01-28-18 @ 15:16) (16 - 18)  SpO2: 100% (01-28-18 @ 15:16) (100% - 100%)  CAPILLARY BLOOD GLUCOSE        I&O's Summary      PHYSICAL EXAM:  GENERAL: NAD, well-developed  HEAD:  Atraumatic, Normocephalic  EYES: EOMI, PERRLA, conjunctiva and sclera clear  NECK: Supple, No JVD  CHEST/LUNG: Clear to auscultation bilaterally; No wheeze  HEART: s1 s2, regular rhythm and rate   ABDOMEN: Soft, Nontender, Nondistended; Bowel sounds present  EXTREMITIES:  2+ Peripheral Pulses, No clubbing, cyanosis, or edema  PSYCH: AAOx3, pleasant today  NEUROLOGY: non-focal  SKIN: Permacath in right chest wall, site clean    LABS:                        10.9   4.80  )-----------( 170      ( 27 Jan 2018 06:15 )             32.2     01-27    136  |  95<L>  |  50<H>  ----------------------------<  92  4.0   |  23  |  6.13<H>    Ca    9.2      27 Jan 2018 06:15                RADIOLOGY & ADDITIONAL TESTS:    Imaging Personally Reviewed:    Consultant(s) Notes Reviewed:      Care Discussed with Consultants/Other Providers:

## 2018-01-29 VITALS
HEART RATE: 65 BPM | TEMPERATURE: 99 F | RESPIRATION RATE: 15 BRPM | DIASTOLIC BLOOD PRESSURE: 97 MMHG | SYSTOLIC BLOOD PRESSURE: 135 MMHG | OXYGEN SATURATION: 100 %

## 2018-01-29 DIAGNOSIS — F43.20 ADJUSTMENT DISORDER, UNSPECIFIED: ICD-10-CM

## 2018-01-29 PROCEDURE — 99223 1ST HOSP IP/OBS HIGH 75: CPT

## 2018-01-29 PROCEDURE — 99233 SBSQ HOSP IP/OBS HIGH 50: CPT

## 2018-01-29 RX ORDER — POLYETHYLENE GLYCOL 3350 17 G/17G
17 POWDER, FOR SOLUTION ORAL
Qty: 0 | Refills: 0 | COMMUNITY
Start: 2018-01-29

## 2018-01-29 RX ORDER — DOCUSATE SODIUM 100 MG
1 CAPSULE ORAL
Qty: 0 | Refills: 0 | COMMUNITY
Start: 2018-01-29

## 2018-01-29 RX ADMIN — AMLODIPINE BESYLATE 10 MILLIGRAM(S): 2.5 TABLET ORAL at 06:08

## 2018-01-29 RX ADMIN — LISINOPRIL 40 MILLIGRAM(S): 2.5 TABLET ORAL at 06:08

## 2018-01-29 RX ADMIN — SEVELAMER CARBONATE 1600 MILLIGRAM(S): 2400 POWDER, FOR SUSPENSION ORAL at 09:24

## 2018-01-29 NOTE — BEHAVIORAL HEALTH ASSESSMENT NOTE - SUMMARY
Pt is 51 y/o man, currently domiciled in shelter in [facility] in [facility], unemployed, no known dependents, pt denies psychiatric history, though admits that he maybe saw a psychiatrist while in [place] because his family made him go, denies past suicide attempts, PMHx of HTN, ESRD on HD M/W/F, presented to [facility] due to HTN. Psychiatry consulted for capacity to leave AMA as patient wants to leave now, is supposed to have HD in the hospital ?today, and although team has located a new dialysis center, this is far from his current shelter and transportation is not yet set up.     On interview patient is able to communicate a choice, as he expressed his desire to leave the hospital numerous times. He is able to understand the relevant information related to his choice. He is able to discuss the need for continued HD as outpatient, and the fact that if he leaves the hospital he may not have his outpatient HD set up in [place]. He understands that if he does not get HD he may get seriously ill or die. He is able to appreciate the situation and consequences and understands that he requires dialysis for his kidney to function properly and that he requires HD to be set up by the hospital in order to get it as outpatient. He is able to reason rationally. He states that he does not like going to HD because it makes him feel tired/weak and he doesn't want to wait in the hospital to set up HD. He wants to go home, is able to get home via subway, and said he can return to hospital if he does not feel well. Based on the aforementioned summary, it is our belief that patient currently has decisional capacity to leave the hospital AMA.

## 2018-01-29 NOTE — DISCHARGE NOTE ADULT - PATIENT PORTAL LINK FT
“You can access the [facility] Patient Portal, offered by [facility], by registering with the following website: [email or web address]”

## 2018-01-29 NOTE — PROGRESS NOTE ADULT - PROVIDER SPECIALTY LIST ADULT
Hospitalist
Nephrology
Hospitalist

## 2018-01-29 NOTE — DISCHARGE NOTE ADULT - MEDICATION SUMMARY - MEDICATIONS TO TAKE
I will START or STAY ON the medications listed below when I get home from the hospital:    lisinopril 40 mg oral tablet  -- 1 tab(s) by mouth once a day  -- Indication: For HTN (hypertension)    Lipitor 40 mg oral tablet  -- 1 tab(s) by mouth once a day  -- Indication: For HLD    amLODIPine 10 mg oral tablet  -- 1 tab(s) by mouth once a day  -- Indication: For HTN (hypertension)    docusate sodium 100 mg oral capsule  -- 1 cap(s) by mouth 3 times a day  -- Indication: For constipation     polyethylene glycol 3350 oral powder for reconstitution  -- 17 gram(s) by mouth once a day (at bedtime), As needed, Constipation  -- Indication: For constipation     sevelamer hydrochloride 800 mg oral tablet  -- 2 tab(s) by mouth 3 times a day  -- Indication: For ESRD (end stage renal disease)    Nephplex Rx oral tablet  -- 1 tab(s) by mouth once a day  -- Indication: For ESRD (end stage renal disease)    B Complex 50 oral tablet, extended release  -- 1 tab(s) by mouth once a day  -- Indication: For HTN (hypertension)    Vitamin B Complex with C oral tablet  -- 1 tab(s) by mouth once a day  -- Indication: For HTN (hypertension)

## 2018-01-29 NOTE — BEHAVIORAL HEALTH ASSESSMENT NOTE - CASE SUMMARY
Pt seen, agree with above. Pt is a 53 y/o man, no formal psych hx, PMHx of HTN, ESRD on HD M/W/F, admitted with HTN. Pt currently wants to leave the hospital before transportation to his outpt HD center is set up. At the current time, pt has capacity to leave AMA given that he is able to generally appreciate his medical situation and the risks of not being able to receive dialysis after discharge. Pt states he will return to a different hospital if he does not feel well after discharge. Please call with any further questions/issues.

## 2018-01-29 NOTE — PROGRESS NOTE ADULT - ASSESSMENT
51 yo homeless M w/ HTN, ESRD on HD (MWF) who presents with SOB and missed HD, a/w hypertensive urgency and need to re-establish/receive HD.
52 year old male with h/o ESRD on HD(MWF), HTN admitted for SOB and missed dialysis for 1 week.
52 year old male with h/o ESRD on HD(MWF), HTN admitted for SOB and missed dialysis session; last HD was 1 week ago.
53 yo homeless M w/ HTN, ESRD on HD (MWF) who presents with SOB and missed HD, a/w hypertensive urgency and need to re-establish/receive HD.
53 yo homeless M w/ HTN, ESRD on HD (MWF) who presents with SOB and missed HD, a/w hypertensive urgency and need to re-establish/receive HD.
Patient examined and ROS reviewed. A case of ESRD examined during dialysis. Patient tolerating dialysis well.  Blood flow through access adequate. Advised to continue UF.
Pt. with ESRD on HD TIW. Pt. seen during HD today. /117 at time of HD rounds.
53 yo homeless M w/ HTN, ESRD on HD (MWF) who presents with SOB and missed HD, a/w hypertensive urgency and need to re-establish/receive HD.
53 yo homeless M w/ HTN, ESRD on HD (MWF) who presents with SOB and missed HD, a/w hypertensive urgency and need to re-establish/receive HD.
51 yo homeless M w/ HTN, ESRD on HD (MWF) who presents with SOB and missed HD, a/w hypertensive urgency and need to re-establish/receive HD.

## 2018-01-29 NOTE — PROGRESS NOTE ADULT - PROBLEM SELECTOR PLAN 2
ESRD likely 2/2 HTN  receiving HD via R permacath but missed 1.5 weeks of HD, which patient attributes to change in shelter location ([place] to [place]) and inability to reach prior HD center, may need new center in [place]  - c/w Renagel, nephrovite and renal diet  - SW consult re: living situation and setup of otpt HD  HD per nephrology
- ESRD likely 2/2 HTN  - receiving HD via R permacath but missed 1.5 weeks of HD, which patient attributes to change in shelter location ([place] to [place]) and inability to reach prior HD center, may need new center in [place]  - , appreciate consult, assistance with HD  - c/w Renagel, nephrovite and renal diet  - SW consult re: living situation and setup of otpt HD  - ?discussion re: AV fistula
ESRD likely 2/2 HTN  receiving HD via R permacath but missed 1.5 weeks of HD, which patient attributes to change in shelter location ([place] to [place]) and inability to reach prior HD center, may need new center in [place]  - c/w Renagel, nephrovite and renal diet  - SW consult re: living situation and setup of otpt HD  HD per nephrology
ESRD likely 2/2 HTN  receiving HD via R permacath but missed 1.5 weeks of HD, which patient attributes to change in shelter location ([place] to [place]) and inability to reach prior HD center, may need new center in [place]  - c/w Renagel, nephrovite and renal diet  - SW consult re: living situation and setup of otpt HD  HD per nephrology  vascular consulted for AVF placement
ESRD likely 2/2 HTN  receiving HD via R permacath but missed 1.5 weeks of HD, which patient attributes to change in shelter location ([place] to [place]) and inability to reach prior HD center, may need new center in [place]  - c/w Renagel, nephrovite and renal diet  - SW consult re: living situation and setup of outpt HD  HD per nephrology  vascular consulted for AVF placement. Pt is refusing. He deems to have the capacity. He understands the risk of infection of the catheter
ESRD likely 2/2 HTN  receiving HD via R permacath but missed 1.5 weeks of HD, which patient attributes to change in shelter location ([place] to [place]) and inability to reach prior HD center, may need new center in [place]  - c/w Renagel, nephrovite and renal diet  - SW consult re: living situation and setup of outpt HD  HD per nephrology  vascular consulted for AVF placement. Pt is refusing. He deems to have the capacity. He understands the risk of infection of the catheter
ESRD likely 2/2 HTN  receiving HD via R permacath but missed 1.5 weeks of HD, which patient attributes to change in shelter location ([place] to [place]) and inability to reach prior HD center, may need new center in [place]  - c/w Renagel, nephrovite and renal diet  - SW consult re: living situation and setup of otpt HD  HD per nephrology
ESRD likely 2/2 HTN  receiving HD via R permacath but missed 1.5 weeks of HD, which patient attributes to change in shelter location ([place] to [place]) and inability to reach prior HD center, may need new center in [place]  - c/w Renagel, nephrovite and renal diet  - SW consult re: living situation and setup of otpt HD  HD per nephrology  vascular consulted for AVF placement. Pt is refusing. He deems to have the capacity. He understands the risk of infection of the catheter
Elevated BP today. C/w lisinopril and amlodipine (give post dialysis on MWF) for now. Will plan for UF with HD today. Monitor BP.
On lisinopril 40mg and amlodipine 10mg (give post dialysis on MWF). UF with HD today. BP remains elevated. Consider starting metoprolol succinate 12.5mg PO BID as tolerated by heart rate. Monitor BP.
ESRD likely 2/2 HTN  receiving HD via R permacath but missed 1.5 weeks of HD, which patient attributes to change in shelter location ([place] to [place]) and inability to reach prior HD center, may need new center in [place]  - c/w Renagel, nephrovite and renal diet  - SW consult re: living situation and setup of otpt HD  HD per nephrology

## 2018-01-29 NOTE — PROGRESS NOTE ADULT - PROBLEM SELECTOR PLAN 3
monitor H/H  stable
- hgb 9.5/hct 28.6  - f/u renal recs regarding use of Epo
monitor H/H  stable
Check serum ferritin and TIBC. Monitor Hb level. Will hold PHYLLIS for now as patient with elevated BPs.
Iron studies noted. Hgb stable. Will hold PHYLLIS for now as patient with elevated BPs.
monitor H/H  stable

## 2018-01-29 NOTE — BEHAVIORAL HEALTH ASSESSMENT NOTE - HPI (INCLUDE ILLNESS QUALITY, SEVERITY, DURATION, TIMING, CONTEXT, MODIFYING FACTORS, ASSOCIATED SIGNS AND SYMPTOMS)
Pt is 51 y/o man, currently domiciled in shelter at [facility] in [facility], unemployed, no known dependents, pt denies psychiatric history, though admits that he saw a psychiatrist while in [place] because his family made him go, denies past suicide attempts, PMHx of HTN, ESRD on HD M//, presented to [facility] due to HTN. Psychiatry consulted for capacity to leave AMA as patient wants to leave now, is supposed to have HD in the hospital ?today, and although team has located a new dialysis center, this is far from his current shelter and transportation is not yet set up.     On interview patient is initially agitated, seen pacing the halls on the floor, but subsequently agreeable to sitting in his room and talking to team. He appears thin, with anxious affect, and is dressed in his street clothing with bags of his belongings packed to leave. He denies formal psychiatric history, stating that "doctors say my brain is good." He denies history of suicidal ideation, intent or plan. Pt  states that he came to [place] from [place] recently and has been living in a shelter (recently moved from [facility] to [facility]). His brothers and sisters live in [place] (both of his parents are ) and he states that he has issues with family so he moved to [place] and arrived via bus. While in [place] he was on HD and says he was compliant, because his brothers and sisters made him go. He states that he doesn't like dialysis because he feels worse after, and says he feels fine now, so doesn't necessarily want to continue. He would not fully respond when asked if he will go to HD if it is set up as outpatient. He wants to currently leave the hospital and doesn't want to stay for team to set up transportation for HD. When asked why he needs HD patient, he knows it is for his kidney. Initially he says he feels fine so he doesn't need it, but later acknowledged that he could die if he does not go. He notes that "god gave me life and god will take it when its time." though this appears to be normal for his Yazidi beliefs, and this doesn't appear to be delusionally driven. (No evidence of psychosis or auditory/visual hallucinations on examination). He is agreeable to take his Anti HTN medications as outpatient. Understands he has a problem with his blood pressure. He states a consistent choice (not wanting to stay in the hospital and wanting to go home). He expresses that he doesn't want HD because of the side effects and he feels it makes him worse, and that is why he sometimes refuses to go. Upon discharge patient plans to return to shelter in [place] via [address]. Says he can take his medications or come back to the hospital if he feels sick again. Denies symptoms of depression. Denies S/H/I/I/P. He is A+O x 4.

## 2018-01-29 NOTE — BEHAVIORAL HEALTH ASSESSMENT NOTE - NSBHADMITCOUNSEL_PSY_A_CORE
instructions for management, treatment and follow up/client/family/caregiver education/risks and benefits of treatment options

## 2018-01-29 NOTE — DISCHARGE NOTE ADULT - HOSPITAL COURSE
· Assessment		  53 yo homeless M w/ HTN, ESRD on HD (MWF) who presents with SOB and missed HD, a/w hypertensive urgency and need to re-establish/receive HD.      Problem/Plan - 1:  ·  Problem: Hypertensive urgency.  Plan: likely due to non-compliance   resolved   monitor BP  c/w norvasc, lisinopril.      Problem/Plan - 2:  ·  Problem: ESRD (end stage renal disease) on dialysis.  Plan: ESRD likely 2/2 HTN  receiving HD via R permacath but missed 1.5 weeks of HD, which patient attributes to change in shelter location ([place] to [place]) and inability to reach prior HD center, may need new center in [place]  - c/w Renagel, nephrovite and renal diet  - SW consult re: living situation and setup of outpt HD  HD per nephrology  vascular consulted for AVF placement. Pt is refusing. He deems to have the capacity. He understands the risk of infection of the catheter.      Problem/Plan - 3:  ·  Problem: Anemia due to chronic kidney disease.  Plan: monitor H/H

## 2018-01-29 NOTE — PROGRESS NOTE ADULT - SUBJECTIVE AND OBJECTIVE BOX
Patient is a 52y old  Male who presents with a chief complaint of SOB, missed dialysis (29 Jan 2018 12:02)      SUBJECTIVE / OVERNIGHT EVENTS: Patient seen by bedside , no acute distress noted  ,wants to go home today, getting agitated at times .     MEDICATIONS  (STANDING):  amLODIPine   Tablet 10 milliGRAM(s) Oral daily  atorvastatin 40 milliGRAM(s) Oral at bedtime  docusate sodium 100 milliGRAM(s) Oral three times a day  heparin  Injectable 5000 Unit(s) SubCutaneous every 8 hours  lisinopril 40 milliGRAM(s) Oral daily  Nephro-shavon 1 Tablet(s) Oral daily  sevelamer hydrochloride 1600 milliGRAM(s) Oral three times a day with meals  vitamin B complex with vitamin C 1 Tablet(s) Oral daily    MEDICATIONS  (PRN):  polyethylene glycol 3350 17 Gram(s) Oral at bedtime PRN Constipation      Vital Signs Last 24 Hrs  T(C): 37 (29 Jan 2018 05:45), Max: 37 (29 Jan 2018 05:45)  T(F): 98.6 (29 Jan 2018 05:45), Max: 98.6 (29 Jan 2018 05:45)  HR: 65 (29 Jan 2018 05:45) (65 - 76)  BP: 135/97 (29 Jan 2018 05:45) (104/75 - 135/97)  BP(mean): --  RR: 15 (29 Jan 2018 05:45) (15 - 16)  SpO2: 100% (29 Jan 2018 05:45) (100% - 100%)  CAPILLARY BLOOD GLUCOSE        I&O's Summary      PHYSICAL EXAM:  GENERAL: NAD, well-developed  HEAD:  Atraumatic, Normocephalic  EYES: EOMI, PERRLA, conjunctiva and sclera clear  NECK: Supple,   CHEST/LUNG: Clear to auscultation bilaterally; No wheeze  HEART: Regular rate and rhythm;  ABDOMEN: Soft, Nontender, Nondistended; Bowel sounds present  EXTREMITIES:  2+ Peripheral Pulses, No clubbing, cyanosis, or edema  PSYCH: AAOx3  NEUROLOGY: non-focal  SKIN: PermCath in right chest wall, site clean      LABS:                    RADIOLOGY & ADDITIONAL TESTS:    Imaging Personally Reviewed:    Consultant(s) Notes Reviewed:      Care Discussed with Consultants/Other Providers: psychiatry

## 2018-01-29 NOTE — PROGRESS NOTE ADULT - PROBLEM SELECTOR PROBLEM 3
Anemia due to chronic kidney disease
Anemia
Anemia
Anemia due to chronic kidney disease

## 2018-01-29 NOTE — DISCHARGE NOTE ADULT - COMMUNITY RESOURCES
[facility] [address] [id number] [phone]. [facility]  [address] [place] [address] [phone]

## 2018-01-29 NOTE — PROGRESS NOTE ADULT - ATTENDING COMMENTS
Patient refused HD today , also refused doppler for vein mapping for AVF, wants to leave AMA  Psych roberto requested, for capacity, pt has the capacity to make medical decision making   D/w again, requested patient to have HD prior to going home. As patient had refused HD in morning, next schedule is  at 3:30 PM, Pt refusion to stay until 3;30 PM, will let him o AMA  Patient hemodynamically stable for discharge home Patient refused HD today , also refused doppler for vein mapping for AVF, wants to leave AMA  Psych eval requested, for capacity, pt has the capacity to make medical decision making   D/w again, requested patient to have HD prior to going home. As patient had refused HD in morning, next schedule is  at 3:30 PM, Pt refusion to stay until 3;30 PM, will let him o AMA  Patient hemodynamically stable for discharge home  case d/w Jake SUMMERS,  pt has as spot fir HD , metro card provide for [facility]

## 2018-01-29 NOTE — DISCHARGE NOTE ADULT - PLAN OF CARE
BP control Low slat diet, take medications as prescribed Symptoms control Last HD Fri1/26/18, pt refused HD on 1/29/18. It is important  that you be compliant with HD to prevent complication such as electrolyte imbalance, fluid overload and death

## 2018-01-29 NOTE — PROGRESS NOTE ADULT - PROBLEM SELECTOR PLAN 4
DVT ppx: HSQ  DASH/TLC renal diet

## 2018-01-29 NOTE — DISCHARGE NOTE ADULT - CARE PLAN
Principal Discharge DX:	Hypertensive urgency  Goal:	BP control  Assessment and plan of treatment:	Low slat diet, take medications as prescribed  Secondary Diagnosis:	ESRD (end stage renal disease)  Goal:	Symptoms control  Assessment and plan of treatment:	Last HD Fri1/26/18, pt refused HD on 1/29/18. It is important  that you be compliant with HD to prevent complication such as electrolyte imbalance, fluid overload and death

## 2018-01-29 NOTE — PROGRESS NOTE ADULT - PROBLEM SELECTOR PROBLEM 2
ESRD (end stage renal disease) on dialysis
HTN (hypertension)
HTN (hypertension)
ESRD (end stage renal disease) on dialysis
ESRD (end stage renal disease) on dialysis

## 2018-01-29 NOTE — PROGRESS NOTE ADULT - PROBLEM SELECTOR PLAN 1
likely due to non-compliance   resolved   monitor BP  c/w norvasc, lisinopril
ESRD on HD on MWF schedule via tunneled HD catheter. Undergoing and tolerating HD well today. Will need to be set up with out-patient dialysis center. Please have Vascular see patient to evaluate for AVF creation.
ESRD on HD on MWF schedule via tunneled HD catheter. Will plan for maintenance HD today with UF, which should help improved BP. Will need to be set up with out-patient dialysis center.
Pt. tolerating HD. Pt. with elevated DBP stable during HD. RIJ HD catheter functioning well. Monitor BP and labs
likely due to non-compliance   resolved   monitor BP  c/w norvasc, lisinopril
- BP elevated to 224/144 in ED, s/p IV hydralazine x2, with subsequent improvement in BP to SBP 160s and improvement in SOB symptoms  - c/w BP regimen, reinforce medication compliance, monitor BP and titrate regimen as needed  - also in setting of missed HD for 1.5 weeks  - appreciate renal assistance with HD

## 2018-01-29 NOTE — PROGRESS NOTE ADULT - PROBLEM SELECTOR PROBLEM 1
Hypertensive urgency
ESRD (end stage renal disease) on dialysis
ESRD on hemodialysis
ESRD on hemodialysis
Hypertensive urgency

## 2018-01-29 NOTE — BEHAVIORAL HEALTH ASSESSMENT NOTE - NSBHCHARTREVIEWVS_PSY_A_CORE FT
T(C): 37 (01-29-18 @ 05:45)  T(F): 98.6 (01-29-18 @ 05:45), Max: 98.6 (01-29-18 @ 05:45)  HR: 65 (01-29-18 @ 05:45) (65 - 76)  BP: 135/97 (01-29-18 @ 05:45) (104/75 - 135/97)  RR:  (15 - 16)  SpO2:  (100% - 100%)  Wt(kg): --

## 2018-02-06 ENCOUNTER — INPATIENT (INPATIENT)
Facility: HOSPITAL | Age: 53
LOS: 5 days | Discharge: HOME | End: 2018-02-12
Attending: HOSPITALIST | Admitting: HOSPITALIST
Payer: MEDICAID

## 2018-02-06 VITALS
TEMPERATURE: 98 F | HEART RATE: 56 BPM | DIASTOLIC BLOOD PRESSURE: 121 MMHG | RESPIRATION RATE: 16 BRPM | OXYGEN SATURATION: 100 % | SYSTOLIC BLOOD PRESSURE: 196 MMHG

## 2018-02-06 DIAGNOSIS — N18.9 CHRONIC KIDNEY DISEASE, UNSPECIFIED: ICD-10-CM

## 2018-02-06 DIAGNOSIS — N18.6 END STAGE RENAL DISEASE: ICD-10-CM

## 2018-02-06 DIAGNOSIS — E87.5 HYPERKALEMIA: ICD-10-CM

## 2018-02-06 DIAGNOSIS — I10 ESSENTIAL (PRIMARY) HYPERTENSION: ICD-10-CM

## 2018-02-06 DIAGNOSIS — D69.6 THROMBOCYTOPENIA, UNSPECIFIED: ICD-10-CM

## 2018-02-06 DIAGNOSIS — Z29.9 ENCOUNTER FOR PROPHYLACTIC MEASURES, UNSPECIFIED: ICD-10-CM

## 2018-02-06 LAB
ALBUMIN SERPL ELPH-MCNC: 4.7 G/DL — SIGNIFICANT CHANGE UP (ref 3.3–5)
ALP SERPL-CCNC: 57 U/L — SIGNIFICANT CHANGE UP (ref 40–120)
ALT FLD-CCNC: 14 U/L — SIGNIFICANT CHANGE UP (ref 4–41)
AST SERPL-CCNC: 13 U/L — SIGNIFICANT CHANGE UP (ref 4–40)
BASOPHILS # BLD AUTO: 0.04 K/UL — SIGNIFICANT CHANGE UP (ref 0–0.2)
BASOPHILS NFR BLD AUTO: 0.7 % — SIGNIFICANT CHANGE UP (ref 0–2)
BILIRUB SERPL-MCNC: 0.6 MG/DL — SIGNIFICANT CHANGE UP (ref 0.2–1.2)
BUN SERPL-MCNC: 113 MG/DL — HIGH (ref 7–23)
CALCIUM SERPL-MCNC: 9.7 MG/DL — SIGNIFICANT CHANGE UP (ref 8.4–10.5)
CHLORIDE SERPL-SCNC: 104 MMOL/L — SIGNIFICANT CHANGE UP (ref 98–107)
CO2 SERPL-SCNC: 17 MMOL/L — LOW (ref 22–31)
CREAT SERPL-MCNC: 8.15 MG/DL — HIGH (ref 0.5–1.3)
EOSINOPHIL # BLD AUTO: 0.09 K/UL — SIGNIFICANT CHANGE UP (ref 0–0.5)
EOSINOPHIL NFR BLD AUTO: 1.6 % — SIGNIFICANT CHANGE UP (ref 0–6)
GLUCOSE SERPL-MCNC: 108 MG/DL — HIGH (ref 70–99)
HCT VFR BLD CALC: 29.7 % — LOW (ref 39–50)
HGB BLD-MCNC: 10.2 G/DL — LOW (ref 13–17)
IMM GRANULOCYTES # BLD AUTO: 0.01 # — SIGNIFICANT CHANGE UP
IMM GRANULOCYTES NFR BLD AUTO: 0.2 % — SIGNIFICANT CHANGE UP (ref 0–1.5)
LYMPHOCYTES # BLD AUTO: 1.5 K/UL — SIGNIFICANT CHANGE UP (ref 1–3.3)
LYMPHOCYTES # BLD AUTO: 26.7 % — SIGNIFICANT CHANGE UP (ref 13–44)
MCHC RBC-ENTMCNC: 32.4 PG — SIGNIFICANT CHANGE UP (ref 27–34)
MCHC RBC-ENTMCNC: 34.3 % — SIGNIFICANT CHANGE UP (ref 32–36)
MCV RBC AUTO: 94.3 FL — SIGNIFICANT CHANGE UP (ref 80–100)
MONOCYTES # BLD AUTO: 0.33 K/UL — SIGNIFICANT CHANGE UP (ref 0–0.9)
MONOCYTES NFR BLD AUTO: 5.9 % — SIGNIFICANT CHANGE UP (ref 2–14)
NEUTROPHILS # BLD AUTO: 3.65 K/UL — SIGNIFICANT CHANGE UP (ref 1.8–7.4)
NEUTROPHILS NFR BLD AUTO: 64.9 % — SIGNIFICANT CHANGE UP (ref 43–77)
NRBC # FLD: 0 — SIGNIFICANT CHANGE UP
PLATELET # BLD AUTO: 118 K/UL — LOW (ref 150–400)
PMV BLD: 12.1 FL — SIGNIFICANT CHANGE UP (ref 7–13)
POTASSIUM SERPL-MCNC: 5.6 MMOL/L — HIGH (ref 3.5–5.3)
POTASSIUM SERPL-SCNC: 5.6 MMOL/L — HIGH (ref 3.5–5.3)
PROT SERPL-MCNC: 7.7 G/DL — SIGNIFICANT CHANGE UP (ref 6–8.3)
RBC # BLD: 3.15 M/UL — LOW (ref 4.2–5.8)
RBC # FLD: 13 % — SIGNIFICANT CHANGE UP (ref 10.3–14.5)
SODIUM SERPL-SCNC: 140 MMOL/L — SIGNIFICANT CHANGE UP (ref 135–145)
WBC # BLD: 5.62 K/UL — SIGNIFICANT CHANGE UP (ref 3.8–10.5)
WBC # FLD AUTO: 5.62 K/UL — SIGNIFICANT CHANGE UP (ref 3.8–10.5)

## 2018-02-06 PROCEDURE — 71046 X-RAY EXAM CHEST 2 VIEWS: CPT | Mod: 26

## 2018-02-06 RX ORDER — SEVELAMER CARBONATE 2400 MG/1
1600 POWDER, FOR SUSPENSION ORAL
Qty: 0 | Refills: 0 | Status: DISCONTINUED | OUTPATIENT
Start: 2018-02-06 | End: 2018-02-12

## 2018-02-06 RX ORDER — ATORVASTATIN CALCIUM 80 MG/1
40 TABLET, FILM COATED ORAL AT BEDTIME
Qty: 0 | Refills: 0 | Status: DISCONTINUED | OUTPATIENT
Start: 2018-02-06 | End: 2018-02-12

## 2018-02-06 RX ORDER — ALBUTEROL 90 UG/1
2.5 AEROSOL, METERED ORAL ONCE
Qty: 0 | Refills: 0 | Status: COMPLETED | OUTPATIENT
Start: 2018-02-06 | End: 2018-02-06

## 2018-02-06 RX ORDER — HEPARIN SODIUM 5000 [USP'U]/ML
5000 INJECTION INTRAVENOUS; SUBCUTANEOUS EVERY 12 HOURS
Qty: 0 | Refills: 0 | Status: DISCONTINUED | OUTPATIENT
Start: 2018-02-06 | End: 2018-02-12

## 2018-02-06 RX ORDER — INSULIN HUMAN 100 [IU]/ML
10 INJECTION, SOLUTION SUBCUTANEOUS ONCE
Qty: 0 | Refills: 0 | Status: COMPLETED | OUTPATIENT
Start: 2018-02-06 | End: 2018-02-06

## 2018-02-06 RX ORDER — AMLODIPINE BESYLATE 2.5 MG/1
10 TABLET ORAL DAILY
Qty: 0 | Refills: 0 | Status: DISCONTINUED | OUTPATIENT
Start: 2018-02-06 | End: 2018-02-12

## 2018-02-06 RX ORDER — DEXTROSE 50 % IN WATER 50 %
50 SYRINGE (ML) INTRAVENOUS ONCE
Qty: 0 | Refills: 0 | Status: COMPLETED | OUTPATIENT
Start: 2018-02-06 | End: 2018-02-06

## 2018-02-06 RX ORDER — DOCUSATE SODIUM 100 MG
100 CAPSULE ORAL THREE TIMES A DAY
Qty: 0 | Refills: 0 | Status: DISCONTINUED | OUTPATIENT
Start: 2018-02-06 | End: 2018-02-12

## 2018-02-06 RX ORDER — POLYETHYLENE GLYCOL 3350 17 G/17G
17 POWDER, FOR SOLUTION ORAL AT BEDTIME
Qty: 0 | Refills: 0 | Status: DISCONTINUED | OUTPATIENT
Start: 2018-02-06 | End: 2018-02-12

## 2018-02-06 RX ORDER — SODIUM CHLORIDE 9 MG/ML
3 INJECTION INTRAMUSCULAR; INTRAVENOUS; SUBCUTANEOUS EVERY 8 HOURS
Qty: 0 | Refills: 0 | Status: DISCONTINUED | OUTPATIENT
Start: 2018-02-06 | End: 2018-02-12

## 2018-02-06 RX ORDER — HYDRALAZINE HCL 50 MG
10 TABLET ORAL ONCE
Qty: 0 | Refills: 0 | Status: COMPLETED | OUTPATIENT
Start: 2018-02-06 | End: 2018-02-06

## 2018-02-06 RX ADMIN — ALBUTEROL 2.5 MILLIGRAM(S): 90 AEROSOL, METERED ORAL at 18:18

## 2018-02-06 RX ADMIN — Medication 10 MILLIGRAM(S): at 18:18

## 2018-02-06 RX ADMIN — Medication 50 MILLILITER(S): at 18:18

## 2018-02-06 RX ADMIN — INSULIN HUMAN 10 UNIT(S): 100 INJECTION, SOLUTION SUBCUTANEOUS at 18:18

## 2018-02-06 NOTE — CONSULT NOTE ADULT - PROBLEM SELECTOR RECOMMENDATION 9
Patient with h/o ESRD on HD. Last HD was done on 1/26/18 during previous hospitalization. Labs reviewed from today and shows mild hyperkalemia. Will arrange for HD today. Monitor BMP daily. Case discussed with Dr. Lopez. Patient with h/o ESRD on HD. Last HD was done on 1/26/18 during previous hospitalization. Labs reviewed from today and shows mild hyperkalemia. Will arrange for HD today. Monitor BMP daily.

## 2018-02-06 NOTE — H&P ADULT - NSHPLABSRESULTS_GEN_ALL_CORE
EKG: sinus bradycardia at 56 BPM, TWI in I, AVl, [place] AVR                          10.2   5.62  )-----------( 118      ( 06 Feb 2018 16:16 )             29.7   02-06    140  |  104  |  113<H>  ----------------------------<  108<H>  5.6<H>   |  17<L>  |  8.15<H>    Ca    9.7      06 Feb 2018 16:16    TPro  7.7  /  Alb  4.7  /  TBili  0.6  /  DBili  x   /  AST  13  /  ALT  14  /  AlkPhos  57  02-06 02-06    140  |  104  |  113<H>  ----------------------------<  108<H>  5.6<H>   |  17<L>  |  8.15<H>    Ca    9.7      06 Feb 2018 16:16    TPro  7.7  /  Alb  4.7  /  TBili  0.6  /  DBili  x   /  AST  13  /  ALT  14  /  AlkPhos  57  02-06                        10.2   5.62  )-----------( 118      ( 06 Feb 2018 16:16 )             29.7     LIVER FUNCTIONS - ( 06 Feb 2018 16:16 )  Alb: 4.7 g/dL / Pro: 7.7 g/dL / ALK PHOS: 57 u/L / ALT: 14 u/L / AST: 13 u/L / GGT: x    < from: Xray Chest 2 Views PA/Lat (02.06.18 @ 14:45) >  IMPRESSION: Stable previously seen tunneled right chest wall tunneled dialysis   catheter. Clear lungs. No pleural effusions or pneumothorax. Stable cardiac and mediastinal silhouettes including tortuous descending thoracic aorta contour. Trachea midline. Unremarkable osseous structures.  < end of copied text >    EKG: sinus bradycardia at 56 BPM, TWI in I, AVl, [place] AVR 02-06    140  |  104  |  113<H>  ----------------------------<  108<H>  5.6<H>   |  17<L>  |  8.15<H>    Ca    9.7      06 Feb 2018 16:16    TPro  7.7  /  Alb  4.7  /  TBili  0.6  /  DBili  x   /  AST  13  /  ALT  14  /  AlkPhos  57  02-06                        10.2   5.62  )-----------( 118      ( 06 Feb 2018 16:16 )             29.7     LIVER FUNCTIONS - ( 06 Feb 2018 16:16 )  Alb: 4.7 g/dL / Pro: 7.7 g/dL / ALK PHOS: 57 u/L / ALT: 14 u/L / AST: 13 u/L / GGT: x    < from: Xray Chest 2 Views PA/Lat (02.06.18 @ 14:45) >  IMPRESSION: Stable previously seen tunneled right chest wall tunneled dialysis   catheter. Clear lungs. No pleural effusions or pneumothorax. Stable cardiac and mediastinal silhouettes including tortuous descending thoracic aorta contour. Trachea midline. Unremarkable osseous structures.  < end of copied text >    EKG personally reviewed: sinus bradycardia at 56 BPM, TWI in I, AVl, [place] AVR; sinusoidal T in V6; Q in V1-V2; QTc 449ms (unchanged from prior)

## 2018-02-06 NOTE — CONSULT NOTE ADULT - SUBJECTIVE AND OBJECTIVE BOX
[facility] Division of Kidney Diseases & Hypertension  INITIAL CONSULT NOTE  [phone]--------------------------------------------------------------------------------  HPI:    52 year old male with h/o ESRD on HD(MWF), HTN presented to ER due to missed dialysis. Patient has history of ESRD secondary to HTN and has been on HD for the past 1 year. Patient was recently admitted to [facility] for SOB and missed dialysis. Last HD was done on 1/26/18 in [facility]. However patient did not go to his dialysis center and has missed HD sessions for the past 1 week.  Patient has right tunneled HD catheter which was placed 1 year ago. Currently patient denies complains of SOB, chest pain, fevers, abdominal pain, nausea, vomiting.       PAST HISTORY  --------------------------------------------------------------------------------  PAST MEDICAL & SURGICAL HISTORY:  ESRD (end stage renal disease)  HTN (hypertension)  No significant past surgical history    FAMILY HISTORY:  No pertinent family history in first degree relatives    PAST SOCIAL HISTORY:    ALLERGIES & MEDICATIONS  --------------------------------------------------------------------------------  Allergies    No Known Allergies    Intolerances      Standing Inpatient Medications    PRN Inpatient Medications      REVIEW OF SYSTEMS  --------------------------------------------------------------------------------  Gen: No  fevers/chills  Head/Eyes/Ears/Mouth: No headache  Respiratory: No dyspnea, cough  CV: No chest pain,   GI: No abdominal pain, diarrhea, nausea, vomiting  MSK: No edema   Neuro: No dizziness/lightheadedness    All other systems were reviewed and are negative, except as noted.    VITALS/PHYSICAL EXAM  --------------------------------------------------------------------------------  T(C): 36.8 (02-06-18 @ 17:09), Max: 36.8 (02-06-18 @ 17:09)  HR: 56 (02-06-18 @ 17:09) (56 - 56)  BP: 196/121 (02-06-18 @ 17:09) (196/121 - 196/121)  RR: 16 (02-06-18 @ 17:09) (16 - 16)  SpO2: 100% (02-06-18 @ 17:09) (100% - 100%)  Wt(kg): --        Physical Exam:  	Gen: Elderly thin male, on room air    	HEENT: anicteric, no JVD  	Pulm: CTA B/L  	CV:  S1S2  	Abd: +BS, soft   	Ext: No B/L Lower ext edema  	Neuro: No focal deficits  	Skin: Warm and dry  	Vascular access: Right IJ tunneled HD catheter present; no erythema or  discharge present at site.      LABS/STUDIES  --------------------------------------------------------------------------------              10.2   5.62  >-----------<  118      [02-06-18 @ 16:16]              29.7     140  |  104  |  113  ----------------------------<  108      [02-06-18 @ 16:16]  5.6   |  17  |  8.15        Ca     9.7     [02-06-18 @ 16:16]    TPro  7.7  /  Alb  4.7  /  TBili  0.6  /  DBili  x   /  AST  13  /  ALT  14  /  AlkPhos  57  [02-06-18 @ 16:16]          Creatinine Trend:  SCr 8.15 [02-06 @ 16:16]  SCr 6.13 [01-27 @ 06:15]  SCr 6.63 [01-26 @ 06:15]  SCr 5.95 [01-24 @ 06:55]  SCr 4.22 [01-23 @ 05:10]

## 2018-02-06 NOTE — H&P ADULT - PROBLEM SELECTOR PLAN 1
Admit to tele  check cbc, bmp, a1c, flp, tsh,  Monitor K s/p HD  s/p HD on 2/6   f/u MD note Admit to tele  check cbc, bmp, a1c, flp, tsh,  Monitor K s/p HD  s/p HD on 2/6   Lisinopril on hold for hyperkalemia  f/u MD note Admit to tele  check cbc, bmp, a1c, flp, tsh,  Monitor K s/p HD  s/p HD on 2/6   Lisinopril on hold for hyperkalemia  f/u MD note  discussed with Dr. Zhao In setting of skipped HD sessions  Admit to tele  check cbc, bmp, a1c, flp, tsh,  Monitor K s/p HD  s/p HD on 2/6   Lisinopril on hold for hyperkalemia

## 2018-02-06 NOTE — ED PROVIDER NOTE - MEDICAL DECISION MAKING DETAILS
52M presenting missing HD for a few weeks allegedly. Has Psy hx with refusal to blood work. Basics, EKG, CXR, admit for HD.

## 2018-02-06 NOTE — ED PROVIDER NOTE - PROGRESS NOTE DETAILS
Pt appears to be agitated when I approached him to get labs and imaging. Multiple attempts were taken to get EKG from this patient. He refused. Pt also refused to have labs drawn. Psy was called - per last note, pt had a capacity to AMA. Will call psy again. Multiple attempts were taken to get EKG from this patient. He refused. Pt also refused to have labs drawn. Psy was called - per last note, pt had a capacity to AMA. [place]: K+ noted to be 5.6.  Will speak with patient re: staying for HD and having an IV placed. [place]: Pt agrees to stay.  Will place IV and tx for hyper K.  Renal aware and will prepare for HD.

## 2018-02-06 NOTE — CONSULT NOTE ADULT - PROBLEM SELECTOR RECOMMENDATION 3
BP noted to be elevated; could be volume related as patient has not received HD for past 1 week. Will arrange for HD/UF today. Continue with current antihypertensives. Monitor BP.

## 2018-02-06 NOTE — CONSULT NOTE ADULT - PROBLEM SELECTOR RECOMMENDATION 2
Mild hyperkalemia in setting of ESRD. Serum K noted to be 5.6 today. EKG shows no peaked T waves. Received Kayexalate and insulin with D50 in ER. Will arrange for HD today. Monitor serum K+.

## 2018-02-06 NOTE — H&P ADULT - RS GEN PE MLT RESP DETAILS PC
airway patent/breath sounds equal/respirations non-labored/good air movement no rhonchi/airway patent/good air movement/normal/breath sounds equal/respirations non-labored/no intercostal retractions/no rales

## 2018-02-06 NOTE — H&P ADULT - NEGATIVE NEUROLOGICAL SYMPTOMS
no weakness/no transient paralysis no difficulty walking/no loss of sensation/no weakness/no transient paralysis/no paresthesias

## 2018-02-06 NOTE — ED PROVIDER NOTE - NS ED ROS FT
Missed HD [employer] 2016 [facility] 8 DRT Pro MRI Pacemaker, bilateral eyes artificial lenses/Pacemaker

## 2018-02-06 NOTE — ED PROVIDER NOTE - ATTENDING CONTRIBUTION TO CARE
I performed a face to face bedside interview with patient regarding history of present illness, review of symptoms and past medical history. I completed an independent physical exam.  I have discussed patient's plan of care.   I agree with note as stated above, having amended the EMR as needed to reflect my findings. I have discussed the assessment and plan of care.  This includes during the time I functioned as the attending physician for this patient.  Attending Contribution to Care: agree with resident. pt p/w poor compliance for HD. pt stable. refusing to change, blood work. requires strict conversation stating will require being compliant in ed or will be d/c. pt eventually allows further evaluation and will be admitted for HD.

## 2018-02-06 NOTE — H&P ADULT - PROBLEM SELECTOR PLAN 3
cont amlodipine   DASH diet Essential hypertension  cont amlodipine   DASH diet Essential hypertension w/hypertensive urgency on presentation  cont amlodipine   DASH diet

## 2018-02-06 NOTE — H&P ADULT - ATTENDING COMMENTS
Briefly, 52-year-old male with HTN, ESRD on HD, living in a shelter, presenting with missed HD sessions, hyperkalemia.  Received temporizing measures in ED, was eval'ed by renal and went for HD with 2900cc removed.  Patient is without complaint, BH note from prior admission appreciated, as patient left AMA prior to HD being set-up as an outpatient, was deemed to have capacity to AMA.  Flat affect on exam but A&Ox3. Repeat BMP. Hypertensive urgency on presentation, BP not yet optimized, patient unsure of home meds, ACE-i on hold due to hyperkalemia, repeat BMP pending.  Hydralazine added for improved BP control.    Agree with PA H&P and plan as edited above.

## 2018-02-06 NOTE — ED ADULT NURSE NOTE - OBJECTIVE STATEMENT
A&Ox3, respirations even and unlabored, left chest port noted, ambulatory. Patient sent by shelter for non-compliance for dialisys. Patient refuses to answer further questions during assessment.

## 2018-02-06 NOTE — H&P ADULT - PROBLEM SELECTOR PLAN 2
HD MWF  Renal consult  social work for HD set up outpatient HD MWF  Renal recs appreciated, s/p HD   social work for HD set up outpatient  c/w phosphate binders, potassium restricted diet

## 2018-02-06 NOTE — ED PROVIDER NOTE - OBJECTIVE STATEMENT
52M presenting for missing HD. Pt has ESRD on HD MWF,       Patient is a 52 year old male with ESRD on HD MWF, and HTN who presented with SOB in setting of missed dialysis for 1 week. Patient has history of ESRD secondary to HTN and has been on HD for the past 1 year. Patient went to dialysis center in the [place] where his shelter was, but moved to a new shelter in [place] without having his HD set up. Patient has right tunneled HD catheter which was placed 1 year ago. Pt. seen and examined during HD today. Pt. feels well and denies CP, SOB, dizziness or HA. 52M presenting for missing HD. Pt has ESRD on HD MWF, allegedly missed 3weeks of HD. Pt lives in the shelter and the supervisor found out that he was not receiving his HD regularly. EMS was called to bring the pt to the hospital. Pt does not have nephrologist in [place] as he used to live in [facility]. Pt initially refused vitals and blood work. Pt states that his father is god and he won't hurt him. Pt later agrees to have vitals and HD. 52M presenting for missing HD. Pt has ESRD on HD MWF, allegedly missed 3weeks of HD. Per our record, pt's last HD is Last HD Fri1/26/18 (pt refused HD on 1/29/2018). Pt lives in the shelter and the supervisor found out that he was not receiving his HD regularly. EMS was called to bring the pt to the hospital. Pt does not have nephrologist in [place] as he used to live in [facility]. Pt initially refused vitals and blood work. Pt states that his father is god and he won't hurt him. Pt later agrees to have vitals and HD. Patient has right tunneled HD catheter which was placed 1 year ago. 52M presenting for missing HD. Pt has ESRD on HD MWF, allegedly missed 3weeks of HD. Per our record, pt's last HD is Last HD Fri1/26/18 (pt refused HD on 1/29/2 018). Pt lives in the shelter and the supervisor found out that he was not receiving his HD regularly. EMS was called to bring the pt to the hospital. Pt does not have nephrologist in [place] as he used to live in [facility]. Pt initially refused vitals and blood work. Pt states that his father is god and he won't hurt him. Pt later agrees to have vitals and HD. Patient has right tunneled HD catheter which was placed 1 year ago.

## 2018-02-06 NOTE — H&P ADULT - HISTORY OF PRESENT ILLNESS
53 y/o M with hx of HTN, ESRD on HD (MWF) presents with missed HD. Patient signed out against medical advise few weeks ago and he has not had any HD since then. Patient lives in shelter and the supervisor at shelter called EMS given patient has not HD since 1/29/2018. Patient does not have any HD set up in [place] and used to get HD in [place]. He refused stay till HD was set up during the last admission. Denies any fever, chills, cough, falls, LOC, chest pain, SOB, abdominal pain, LE edema, or calf tenderness. 51 y/o M with hx of HTN, ESRD on HD (MWF) presents with missed HD. Patient signed out against medical advise few weeks ago and he has not had any HD since then. Patient lives in shelter and the supervisor at shelter called EMS given patient has not HD since 1/29/2018. Patient does not have any HD set up in [place] and used to get HD in [place]. He refused stay until HD was set up during the last admission. Denies any fever, chills, cough, falls, LOC, chest pain, SOB, abdominal pain, LE edema, or calf tenderness.

## 2018-02-06 NOTE — CONSULT NOTE ADULT - ASSESSMENT
52 year old male with h/o ESRD on HD(MWF), HTN admitted due to missed dialysis; last HD was on 1/26/18.

## 2018-02-07 DIAGNOSIS — Z02.9 ENCOUNTER FOR ADMINISTRATIVE EXAMINATIONS, UNSPECIFIED: ICD-10-CM

## 2018-02-07 LAB
BASOPHILS # BLD AUTO: 0.03 K/UL — SIGNIFICANT CHANGE UP (ref 0–0.2)
BASOPHILS NFR BLD AUTO: 0.7 % — SIGNIFICANT CHANGE UP (ref 0–2)
BUN SERPL-MCNC: 53 MG/DL — HIGH (ref 7–23)
CALCIUM SERPL-MCNC: 9.3 MG/DL — SIGNIFICANT CHANGE UP (ref 8.4–10.5)
CHLORIDE SERPL-SCNC: 98 MMOL/L — SIGNIFICANT CHANGE UP (ref 98–107)
CHOLEST SERPL-MCNC: 106 MG/DL — LOW (ref 120–199)
CO2 SERPL-SCNC: 26 MMOL/L — SIGNIFICANT CHANGE UP (ref 22–31)
CREAT SERPL-MCNC: 5.12 MG/DL — HIGH (ref 0.5–1.3)
EOSINOPHIL # BLD AUTO: 0.1 K/UL — SIGNIFICANT CHANGE UP (ref 0–0.5)
EOSINOPHIL NFR BLD AUTO: 2.2 % — SIGNIFICANT CHANGE UP (ref 0–6)
GLUCOSE SERPL-MCNC: 98 MG/DL — SIGNIFICANT CHANGE UP (ref 70–99)
HBA1C BLD-MCNC: 5.5 % — SIGNIFICANT CHANGE UP (ref 4–5.6)
HCT VFR BLD CALC: 28.5 % — LOW (ref 39–50)
HDLC SERPL-MCNC: 39 MG/DL — SIGNIFICANT CHANGE UP (ref 35–55)
HGB BLD-MCNC: 9.9 G/DL — LOW (ref 13–17)
IMM GRANULOCYTES # BLD AUTO: 0.01 # — SIGNIFICANT CHANGE UP
IMM GRANULOCYTES NFR BLD AUTO: 0.2 % — SIGNIFICANT CHANGE UP (ref 0–1.5)
LIPID PNL WITH DIRECT LDL SERPL: 63 MG/DL — SIGNIFICANT CHANGE UP
LYMPHOCYTES # BLD AUTO: 1.16 K/UL — SIGNIFICANT CHANGE UP (ref 1–3.3)
LYMPHOCYTES # BLD AUTO: 25.7 % — SIGNIFICANT CHANGE UP (ref 13–44)
MAGNESIUM SERPL-MCNC: 2.4 MG/DL — SIGNIFICANT CHANGE UP (ref 1.6–2.6)
MCHC RBC-ENTMCNC: 31.3 PG — SIGNIFICANT CHANGE UP (ref 27–34)
MCHC RBC-ENTMCNC: 34.7 % — SIGNIFICANT CHANGE UP (ref 32–36)
MCV RBC AUTO: 90.2 FL — SIGNIFICANT CHANGE UP (ref 80–100)
MONOCYTES # BLD AUTO: 0.3 K/UL — SIGNIFICANT CHANGE UP (ref 0–0.9)
MONOCYTES NFR BLD AUTO: 6.7 % — SIGNIFICANT CHANGE UP (ref 2–14)
NEUTROPHILS # BLD AUTO: 2.91 K/UL — SIGNIFICANT CHANGE UP (ref 1.8–7.4)
NEUTROPHILS NFR BLD AUTO: 64.5 % — SIGNIFICANT CHANGE UP (ref 43–77)
NRBC # FLD: 0 — SIGNIFICANT CHANGE UP
PHOSPHATE SERPL-MCNC: 3.9 MG/DL — SIGNIFICANT CHANGE UP (ref 2.5–4.5)
PLATELET # BLD AUTO: 116 K/UL — LOW (ref 150–400)
PMV BLD: 12.4 FL — SIGNIFICANT CHANGE UP (ref 7–13)
POTASSIUM SERPL-MCNC: 4.5 MMOL/L — SIGNIFICANT CHANGE UP (ref 3.5–5.3)
POTASSIUM SERPL-SCNC: 4.5 MMOL/L — SIGNIFICANT CHANGE UP (ref 3.5–5.3)
RBC # BLD: 3.16 M/UL — LOW (ref 4.2–5.8)
RBC # FLD: 12.9 % — SIGNIFICANT CHANGE UP (ref 10.3–14.5)
SODIUM SERPL-SCNC: 138 MMOL/L — SIGNIFICANT CHANGE UP (ref 135–145)
TRIGL SERPL-MCNC: 47 MG/DL — SIGNIFICANT CHANGE UP (ref 10–149)
TSH SERPL-MCNC: 1.8 UIU/ML — SIGNIFICANT CHANGE UP (ref 0.27–4.2)
WBC # BLD: 4.51 K/UL — SIGNIFICANT CHANGE UP (ref 3.8–10.5)
WBC # FLD AUTO: 4.51 K/UL — SIGNIFICANT CHANGE UP (ref 3.8–10.5)

## 2018-02-07 PROCEDURE — 12345: CPT | Mod: NC

## 2018-02-07 PROCEDURE — 99223 1ST HOSP IP/OBS HIGH 75: CPT

## 2018-02-07 PROCEDURE — 99223 1ST HOSP IP/OBS HIGH 75: CPT | Mod: GC

## 2018-02-07 RX ORDER — HYDRALAZINE HCL 50 MG
10 TABLET ORAL EVERY 8 HOURS
Qty: 0 | Refills: 0 | Status: DISCONTINUED | OUTPATIENT
Start: 2018-02-07 | End: 2018-02-12

## 2018-02-07 RX ORDER — INFLUENZA VIRUS VACCINE 15; 15; 15; 15 UG/.5ML; UG/.5ML; UG/.5ML; UG/.5ML
0.5 SUSPENSION INTRAMUSCULAR ONCE
Qty: 0 | Refills: 0 | Status: DISCONTINUED | OUTPATIENT
Start: 2018-02-07 | End: 2018-02-12

## 2018-02-07 RX ADMIN — Medication 1 TABLET(S): at 12:17

## 2018-02-07 RX ADMIN — SEVELAMER CARBONATE 1600 MILLIGRAM(S): 2400 POWDER, FOR SUSPENSION ORAL at 17:37

## 2018-02-07 RX ADMIN — Medication 10 MILLIGRAM(S): at 15:22

## 2018-02-07 RX ADMIN — SODIUM CHLORIDE 3 MILLILITER(S): 9 INJECTION INTRAMUSCULAR; INTRAVENOUS; SUBCUTANEOUS at 05:59

## 2018-02-07 RX ADMIN — SEVELAMER CARBONATE 1600 MILLIGRAM(S): 2400 POWDER, FOR SUSPENSION ORAL at 11:14

## 2018-02-07 RX ADMIN — ATORVASTATIN CALCIUM 40 MILLIGRAM(S): 80 TABLET, FILM COATED ORAL at 21:42

## 2018-02-07 RX ADMIN — SEVELAMER CARBONATE 1600 MILLIGRAM(S): 2400 POWDER, FOR SUSPENSION ORAL at 12:17

## 2018-02-07 RX ADMIN — Medication 100 MILLIGRAM(S): at 15:22

## 2018-02-07 RX ADMIN — HEPARIN SODIUM 5000 UNIT(S): 5000 INJECTION INTRAVENOUS; SUBCUTANEOUS at 06:05

## 2018-02-07 RX ADMIN — Medication 100 MILLIGRAM(S): at 21:42

## 2018-02-07 RX ADMIN — AMLODIPINE BESYLATE 10 MILLIGRAM(S): 2.5 TABLET ORAL at 06:05

## 2018-02-07 RX ADMIN — SODIUM CHLORIDE 3 MILLILITER(S): 9 INJECTION INTRAMUSCULAR; INTRAVENOUS; SUBCUTANEOUS at 21:43

## 2018-02-07 RX ADMIN — HEPARIN SODIUM 5000 UNIT(S): 5000 INJECTION INTRAVENOUS; SUBCUTANEOUS at 17:37

## 2018-02-07 RX ADMIN — Medication 100 MILLIGRAM(S): at 06:05

## 2018-02-07 RX ADMIN — SODIUM CHLORIDE 3 MILLILITER(S): 9 INJECTION INTRAMUSCULAR; INTRAVENOUS; SUBCUTANEOUS at 14:24

## 2018-02-07 RX ADMIN — Medication 10 MILLIGRAM(S): at 21:42

## 2018-02-07 NOTE — DISCHARGE NOTE ADULT - MEDICATION SUMMARY - MEDICATIONS TO TAKE
I will START or STAY ON the medications listed below when I get home from the hospital:    Lipitor 40 mg oral tablet  -- 1 tab(s) by mouth once a day  -- Indication: For Hyperlipidemia    amLODIPine 10 mg oral tablet  -- 1 tab(s) by mouth once a day  -- Indication: For Hypertension    docusate sodium 100 mg oral capsule  -- 1 cap(s) by mouth 3 times a day  -- Indication: For constipation    polyethylene glycol 3350 oral powder for reconstitution  -- 17 gram(s) by mouth once a day (at bedtime), As needed, Constipation  -- Indication: For constipation    sevelamer hydrochloride 800 mg oral tablet  -- 2 tab(s) by mouth 3 times a day  -- Indication: For ESRD on hemodialysis    hydrALAZINE 10 mg oral tablet  -- 1 tab(s) by mouth every 8 hours  -- Indication: For Hypertension    Nephplex Rx oral tablet  -- 1 tab(s) by mouth once a day  -- Indication: For supplement    Vitamin B Complex with C oral tablet  -- 1 tab(s) by mouth once a day  -- Indication: For supplement

## 2018-02-07 NOTE — PROGRESS NOTE ADULT - PROBLEM SELECTOR PLAN 5
-Patient does not have HD setup near him at his shelter in [place]. Prior to discharge, patient requires outpatient HD to be setup.   -SW aware and working on placement

## 2018-02-07 NOTE — DISCHARGE NOTE ADULT - HOSPITAL COURSE
53 y/o male with a PMHx of ESRD on HD via permacath who AMAed last admission prior to having HD placement presents to ED secondary to missed dialysis sessions. Pt was found to be hyperkalemic and hypertensive secondary to volume status in the setting of missed dialysis. Pt received HD on 2/6 with resolution of hyperkalemia and improvement in blood pressure.  working on HD placement. Tele D/Ruddy and pt transferred to [facility]. 51 y/o male with a PMHx of ESRD on HD via permacath who AMAed last admission prior to having HD placement presents to ED secondary to missed dialysis sessions. Pt was found to be hyperkalemic and hypertensive secondary to volume status in the setting of missed dialysis. Pt received HD on 2/6 with resolution of hyperkalemia and improvement in blood pressure.  working on HD placement. Tele D/Ruddy and pt transferred to [facility].   SW made HD arrangements - patient accepted - BP medications modified  lisinopril stopped 2/2 hyperkalemia - hydralazine added for BP control    dispo: home

## 2018-02-07 NOTE — DISCHARGE NOTE ADULT - CARE PLAN
Principal Discharge DX:	ESRD on hemodialysis  Goal:	maintain stable fluid status  Assessment and plan of treatment:	Continue to go to hemodialysis sessions as ordered  Secondary Diagnosis:	HTN (hypertension)  Assessment and plan of treatment:	Continue blood pressure medication regimen as directed. Monitor for any visual changes, headaches or dizziness.  Monitor blood pressure regularly.  Follow up with your PCP for further mangement for linda blood pressure.  Secondary Diagnosis:	Hyperkalemia  Assessment and plan of treatment:	STOP lisinopril and follow up with your nephrologist

## 2018-02-07 NOTE — DISCHARGE NOTE ADULT - PROVIDER TOKENS
FREE:[LAST:[Nephrologist at the HD facility],PHONE:[(   )    -],FAX:[(   )    -]],FREE:[LAST:[[facility]],PHONE:[phone]],FAX:[(   )    -]]

## 2018-02-07 NOTE — PROGRESS NOTE ADULT - PROBLEM SELECTOR PLAN 3
-Now resolved s/p HD  -If patient unable to have outpatient HD setup, can dc tele and transfer to [facility]

## 2018-02-07 NOTE — PROGRESS NOTE ADULT - ASSESSMENT
51 yo M with ESRD on HD ([facility]) 53 yo M with ESRD on HD (MWF), HTN, homeless from shelter p/w HD after not receiving it for a week in the setting of no outpatient HD set-up since patient's move from [place]. Patient previously left AMA prior to setup of outpt HD.

## 2018-02-07 NOTE — DISCHARGE NOTE ADULT - CARE PROVIDER_API CALL
Nephrologist at the HD facility,   Phone: (   )    -  Fax: (   )    -    [facility],   Phone: [phone]  Fax: (   )    -

## 2018-02-07 NOTE — DISCHARGE NOTE ADULT - PATIENT PORTAL LINK FT
You can access the [facility] Patient Portal, offered by [facility], by registering with the following website: [email or web address]

## 2018-02-07 NOTE — DISCHARGE NOTE ADULT - PLAN OF CARE
maintain stable fluid status Continue to go to hemodialysis sessions as ordered Continue blood pressure medication regimen as directed. Monitor for any visual changes, headaches or dizziness.  Monitor blood pressure regularly.  Follow up with your PCP for further mangement for linda blood pressure. STOP lisinopril and follow up with your nephrologist

## 2018-02-07 NOTE — PROGRESS NOTE ADULT - SUBJECTIVE AND OBJECTIVE BOX
Patient is a 52y old  Male who presents with a chief complaint of Missed HD (06 Feb 2018 21:52)      SUBJECTIVE / OVERNIGHT EVENTS: No acute events overnight. Had HD this AM without issue. Denies any CP, SOB, palpitations, LE edema. Patient reports he urinates slightly at baseline.     MEDICATIONS  (STANDING):  amLODIPine   Tablet 10 milliGRAM(s) Oral daily  atorvastatin 40 milliGRAM(s) Oral at bedtime  docusate sodium 100 milliGRAM(s) Oral three times a day  heparin  Injectable 5000 Unit(s) SubCutaneous every 12 hours  hydrALAZINE 10 milliGRAM(s) Oral every 8 hours  Nephro-shavon 1 Tablet(s) Oral daily  sevelamer hydrochloride 1600 milliGRAM(s) Oral three times a day with meals  sodium chloride 0.9% lock flush 3 milliLiter(s) IV Push every 8 hours    MEDICATIONS  (PRN):  polyethylene glycol 3350 17 Gram(s) Oral at bedtime PRN Constipation      Vital Signs Last 24 Hrs  T(C): 36.7 (02-07-18 @ 09:22)  T(F): 98 (02-07-18 @ 09:22), Max: 98.8 (02-07-18 @ 00:06)  HR: 78 (02-07-18 @ 09:22) (56 - 78)  BP: 149/109 (02-07-18 @ 09:22)  BP(mean): --  RR: 16 (02-07-18 @ 09:22) (16 - 18)  SpO2: 100% (02-07-18 @ 09:22) (100% - 100%)  Wt(kg): --    02-06 @ 07:01  -  02-07 @ 07:00  --------------------------------------------------------  IN: 400 mL / OUT: 2900 mL / NET: -2500 mL      CAPILLARY BLOOD GLUCOSE        I&O's Summary    06 Feb 2018 07:01  -  07 Feb 2018 07:00  --------------------------------------------------------  IN: 400 mL / OUT: 2900 mL / NET: -2500 mL        PHYSICAL EXAM:  GENERAL: NAD, poorly kept  HEAD:  Atraumatic, Normocephalic  EYES:  PERRLA, conjunctiva and sclera clear  NECK: Supple, No JVD  CHEST/LUNG: Clear to auscultation bilaterally; No wheeze  HEART: Regular rate and rhythm; No murmurs,  ABDOMEN: Soft, Nontender, Nondistended; Bowel sounds present  EXTREMITIES:  2+ Peripheral Pulses, No edema  PSYCH: AAOx3, calm, cooperative  NEUROLOGY: non-focal  SKIN: No rashes or lesions    LABS:                        9.9    4.51  )-----------( 116      ( 07 Feb 2018 08:00 )             28.5     02-07    138  |  98  |  53<H>  ----------------------------<  98  4.5   |  26  |  5.12<H>    Ca    9.3      07 Feb 2018 08:00  Phos  3.9     02-07  Mg     2.4     02-07    TPro  7.7  /  Alb  4.7  /  TBili  0.6  /  DBili  x   /  AST  13  /  ALT  14  /  AlkPhos  57  02-06      RADIOLOGY & ADDITIONAL TESTS:    Imaging Personally Reviewed:  < from: Xray Chest 2 Views PA/Lat (02.06.18 @ 14:45) >  IMPRESSION:  Stable previously seen tunneled right chest wall tunneled dialysis   catheter.    Clear lungs. No pleural effusions or pneumothorax.    Stable cardiac and mediastinal silhouettes including tortuous descending   thoracic aorta contour.     Trachea midline.    Unremarkable osseous structures.    < end of copied text >      Consultant(s) Notes Reviewed:  Nephrology     Care Discussed with Consultants/Other Providers:    Assessment and Plan: Patient is a 52y old  Male who presents with a chief complaint of Missed HD (06 Feb 2018 21:52)      SUBJECTIVE / OVERNIGHT EVENTS: No acute events overnight. Had HD this AM without issue. Denies any CP, SOB, palpitations, LE edema. Patient reports he urinates slightly at baseline.     MEDICATIONS  (STANDING):  amLODIPine   Tablet 10 milliGRAM(s) Oral daily  atorvastatin 40 milliGRAM(s) Oral at bedtime  docusate sodium 100 milliGRAM(s) Oral three times a day  heparin  Injectable 5000 Unit(s) SubCutaneous every 12 hours  hydrALAZINE 10 milliGRAM(s) Oral every 8 hours  Nephro-shavon 1 Tablet(s) Oral daily  sevelamer hydrochloride 1600 milliGRAM(s) Oral three times a day with meals  sodium chloride 0.9% lock flush 3 milliLiter(s) IV Push every 8 hours    MEDICATIONS  (PRN):  polyethylene glycol 3350 17 Gram(s) Oral at bedtime PRN Constipation      Vital Signs Last 24 Hrs  T(C): 36.7 (02-07-18 @ 09:22)  T(F): 98 (02-07-18 @ 09:22), Max: 98.8 (02-07-18 @ 00:06)  HR: 78 (02-07-18 @ 09:22) (56 - 78)  BP: 149/109 (02-07-18 @ 09:22)  BP(mean): --  RR: 16 (02-07-18 @ 09:22) (16 - 18)  SpO2: 100% (02-07-18 @ 09:22) (100% - 100%)  Wt(kg): --    02-06 @ 07:01  -  02-07 @ 07:00  --------------------------------------------------------  IN: 400 mL / OUT: 2900 mL / NET: -2500 mL      CAPILLARY BLOOD GLUCOSE        I&O's Summary    06 Feb 2018 07:01  -  07 Feb 2018 07:00  --------------------------------------------------------  IN: 400 mL / OUT: 2900 mL / NET: -2500 mL        PHYSICAL EXAM:  GENERAL: NAD, poorly kept  HEAD:  Atraumatic, Normocephalic  EYES:  PERRLA, conjunctiva and sclera clear  NECK: Supple, No JVD  CHEST/LUNG: Clear to auscultation bilaterally; No wheeze  HEART: Regular rate and rhythm; No murmurs,  ABDOMEN: Soft, Nontender, Nondistended; Bowel sounds present  EXTREMITIES:  2+ Peripheral Pulses, No edema  PSYCH: AAOx3, calm, cooperative  NEUROLOGY: non-focal  SKIN: No rashes or lesions, +Right IJ permacath    LABS:                        9.9    4.51  )-----------( 116      ( 07 Feb 2018 08:00 )             28.5     02-07    138  |  98  |  53<H>  ----------------------------<  98  4.5   |  26  |  5.12<H>    Ca    9.3      07 Feb 2018 08:00  Phos  3.9     02-07  Mg     2.4     02-07    TPro  7.7  /  Alb  4.7  /  TBili  0.6  /  DBili  x   /  AST  13  /  ALT  14  /  AlkPhos  57  02-06      RADIOLOGY & ADDITIONAL TESTS:    Imaging Personally Reviewed:  < from: Xray Chest 2 Views PA/Lat (02.06.18 @ 14:45) >  IMPRESSION:  Stable previously seen tunneled right chest wall tunneled dialysis   catheter.    Clear lungs. No pleural effusions or pneumothorax.    Stable cardiac and mediastinal silhouettes including tortuous descending   thoracic aorta contour.     Trachea midline.    Unremarkable osseous structures.    < end of copied text >      Consultant(s) Notes Reviewed:  Nephrology     Care Discussed with Consultants/Other Providers:    Assessment and Plan:

## 2018-02-07 NOTE — PROGRESS NOTE ADULT - PROBLEM SELECTOR PLAN 1
-Patient tolerated HD without issue today, no s/s of volume overload on exam  -c/w Nephrovite, Phoslo  -Patient not attending regularly scheduled outpatient HD as he does not have any setup near him in [facility]. HD MELINA, Brandie, made aware and working on placement  -F/u further renal recs

## 2018-02-08 PROCEDURE — 99233 SBSQ HOSP IP/OBS HIGH 50: CPT

## 2018-02-08 RX ADMIN — Medication 100 MILLIGRAM(S): at 21:10

## 2018-02-08 RX ADMIN — Medication 1 TABLET(S): at 12:24

## 2018-02-08 RX ADMIN — Medication 10 MILLIGRAM(S): at 21:10

## 2018-02-08 RX ADMIN — SODIUM CHLORIDE 3 MILLILITER(S): 9 INJECTION INTRAMUSCULAR; INTRAVENOUS; SUBCUTANEOUS at 05:41

## 2018-02-08 RX ADMIN — SODIUM CHLORIDE 3 MILLILITER(S): 9 INJECTION INTRAMUSCULAR; INTRAVENOUS; SUBCUTANEOUS at 21:08

## 2018-02-08 RX ADMIN — SEVELAMER CARBONATE 1600 MILLIGRAM(S): 2400 POWDER, FOR SUSPENSION ORAL at 12:23

## 2018-02-08 RX ADMIN — Medication 100 MILLIGRAM(S): at 05:42

## 2018-02-08 RX ADMIN — Medication 10 MILLIGRAM(S): at 13:01

## 2018-02-08 RX ADMIN — Medication 100 MILLIGRAM(S): at 13:01

## 2018-02-08 RX ADMIN — AMLODIPINE BESYLATE 10 MILLIGRAM(S): 2.5 TABLET ORAL at 05:42

## 2018-02-08 RX ADMIN — ATORVASTATIN CALCIUM 40 MILLIGRAM(S): 80 TABLET, FILM COATED ORAL at 21:10

## 2018-02-08 RX ADMIN — Medication 10 MILLIGRAM(S): at 05:42

## 2018-02-08 RX ADMIN — SODIUM CHLORIDE 3 MILLILITER(S): 9 INJECTION INTRAMUSCULAR; INTRAVENOUS; SUBCUTANEOUS at 13:01

## 2018-02-08 RX ADMIN — SEVELAMER CARBONATE 1600 MILLIGRAM(S): 2400 POWDER, FOR SUSPENSION ORAL at 17:05

## 2018-02-08 NOTE — PROGRESS NOTE ADULT - SUBJECTIVE AND OBJECTIVE BOX
Patient is a 52y old  Male who presents with a chief complaint of Missed HD (07 Feb 2018 13:03)      SUBJECTIVE / OVERNIGHT EVENTS: No events, pt without complaints, anticipating d/c.    MEDICATIONS  (STANDING):  amLODIPine   Tablet 10 milliGRAM(s) Oral daily  atorvastatin 40 milliGRAM(s) Oral at bedtime  docusate sodium 100 milliGRAM(s) Oral three times a day  heparin  Injectable 5000 Unit(s) SubCutaneous every 12 hours  hydrALAZINE 10 milliGRAM(s) Oral every 8 hours  influenza   Vaccine 0.5 milliLiter(s) IntraMuscular once  Nephro-shavon 1 Tablet(s) Oral daily  sevelamer hydrochloride 1600 milliGRAM(s) Oral three times a day with meals  sodium chloride 0.9% lock flush 3 milliLiter(s) IV Push every 8 hours    MEDICATIONS  (PRN):  polyethylene glycol 3350 17 Gram(s) Oral at bedtime PRN Constipation      Vital Signs Last 24 Hrs  T(C): 36.8 (02-08-18 @ 13:00), Max: 37.2 (02-07-18 @ 18:18)  T(F): 98.3 (02-08-18 @ 13:00), Max: 98.9 (02-07-18 @ 18:18)  HR: 71 (02-08-18 @ 13:00) (68 - 77)  BP: 138/86 (02-08-18 @ 13:00) (124/90 - 162/90)  BP(mean): --  RR: 18 (02-08-18 @ 13:00) (17 - 18)  SpO2: 100% (02-08-18 @ 13:00) (99% - 100%)  CAPILLARY BLOOD GLUCOSE        I&O's Summary      PHYSICAL EXAM: Refused exam  GENERAL: NAD, thin  HEENT: refused  CHEST/LUNG: refused  HEART: refused  ABDOMEN: refused  EXTREMITIES:  refused  PSYCH: AAOx3  NEUROLOGY: refused  SKIN: refused    LABS:                        9.9    4.51  )-----------( 116      ( 07 Feb 2018 08:00 )             28.5     02-07    138  |  98  |  53<H>  ----------------------------<  98  4.5   |  26  |  5.12<H>    Ca    9.3      07 Feb 2018 08:00  Phos  3.9     02-07  Mg     2.4     02-07    TPro  7.7  /  Alb  4.7  /  TBili  0.6  /  DBili  x   /  AST  13  /  ALT  14  /  AlkPhos  57  02-06              RADIOLOGY & ADDITIONAL TESTS:    Imaging Personally Reviewed:    Consultant(s) Notes Reviewed:      Care Discussed with Consultants/Other Providers:

## 2018-02-08 NOTE — PROGRESS NOTE ADULT - PROBLEM SELECTOR PLAN 5
-Patient does not have HD setup near him at his shelter in [place]. Prior to discharge, patient requires outpatient HD to be setup.   -SW aware and working on placement

## 2018-02-08 NOTE — PROGRESS NOTE ADULT - ASSESSMENT
53 yo M with ESRD on HD (MWF), HTN, homeless from shelter p/w HD after not receiving it for a week in the setting of no outpatient HD set-up since patient's move from [place]. Patient previously left AMA prior to setup of outpt HD.

## 2018-02-08 NOTE — PROGRESS NOTE ADULT - PROBLEM SELECTOR PLAN 1
-Patient tolerated HD without issue today, no s/s of volume overload on exam  -c/w Nephrovite, Phoslo  -Patient not attending regularly scheduled outpatient HD as he does not have any setup near him in [facility]. HD MELINA, Brandie, made aware and working on placement

## 2018-02-09 LAB
BASOPHILS # BLD AUTO: 0.03 K/UL — SIGNIFICANT CHANGE UP (ref 0–0.2)
BASOPHILS NFR BLD AUTO: 0.7 % — SIGNIFICANT CHANGE UP (ref 0–2)
BUN SERPL-MCNC: 71 MG/DL — HIGH (ref 7–23)
CALCIUM SERPL-MCNC: 9 MG/DL — SIGNIFICANT CHANGE UP (ref 8.4–10.5)
CHLORIDE SERPL-SCNC: 96 MMOL/L — LOW (ref 98–107)
CO2 SERPL-SCNC: 24 MMOL/L — SIGNIFICANT CHANGE UP (ref 22–31)
CREAT SERPL-MCNC: 7.34 MG/DL — HIGH (ref 0.5–1.3)
EOSINOPHIL # BLD AUTO: 0.16 K/UL — SIGNIFICANT CHANGE UP (ref 0–0.5)
EOSINOPHIL NFR BLD AUTO: 3.8 % — SIGNIFICANT CHANGE UP (ref 0–6)
GLUCOSE SERPL-MCNC: 93 MG/DL — SIGNIFICANT CHANGE UP (ref 70–99)
HCT VFR BLD CALC: 27.5 % — LOW (ref 39–50)
HGB BLD-MCNC: 9.6 G/DL — LOW (ref 13–17)
IMM GRANULOCYTES # BLD AUTO: 0.01 # — SIGNIFICANT CHANGE UP
IMM GRANULOCYTES NFR BLD AUTO: 0.2 % — SIGNIFICANT CHANGE UP (ref 0–1.5)
LYMPHOCYTES # BLD AUTO: 1.41 K/UL — SIGNIFICANT CHANGE UP (ref 1–3.3)
LYMPHOCYTES # BLD AUTO: 33.3 % — SIGNIFICANT CHANGE UP (ref 13–44)
MAGNESIUM SERPL-MCNC: 2.6 MG/DL — SIGNIFICANT CHANGE UP (ref 1.6–2.6)
MCHC RBC-ENTMCNC: 32.8 PG — SIGNIFICANT CHANGE UP (ref 27–34)
MCHC RBC-ENTMCNC: 34.9 % — SIGNIFICANT CHANGE UP (ref 32–36)
MCV RBC AUTO: 93.9 FL — SIGNIFICANT CHANGE UP (ref 80–100)
MONOCYTES # BLD AUTO: 0.36 K/UL — SIGNIFICANT CHANGE UP (ref 0–0.9)
MONOCYTES NFR BLD AUTO: 8.5 % — SIGNIFICANT CHANGE UP (ref 2–14)
NEUTROPHILS # BLD AUTO: 2.27 K/UL — SIGNIFICANT CHANGE UP (ref 1.8–7.4)
NEUTROPHILS NFR BLD AUTO: 53.5 % — SIGNIFICANT CHANGE UP (ref 43–77)
NRBC # FLD: 0 — SIGNIFICANT CHANGE UP
PHOSPHATE SERPL-MCNC: 4.6 MG/DL — HIGH (ref 2.5–4.5)
PLATELET # BLD AUTO: 110 K/UL — LOW (ref 150–400)
PMV BLD: 12.8 FL — SIGNIFICANT CHANGE UP (ref 7–13)
POTASSIUM SERPL-MCNC: 4.4 MMOL/L — SIGNIFICANT CHANGE UP (ref 3.5–5.3)
POTASSIUM SERPL-SCNC: 4.4 MMOL/L — SIGNIFICANT CHANGE UP (ref 3.5–5.3)
RBC # BLD: 2.93 M/UL — LOW (ref 4.2–5.8)
RBC # FLD: 13 % — SIGNIFICANT CHANGE UP (ref 10.3–14.5)
SODIUM SERPL-SCNC: 135 MMOL/L — SIGNIFICANT CHANGE UP (ref 135–145)
WBC # BLD: 4.24 K/UL — SIGNIFICANT CHANGE UP (ref 3.8–10.5)
WBC # FLD AUTO: 4.24 K/UL — SIGNIFICANT CHANGE UP (ref 3.8–10.5)

## 2018-02-09 PROCEDURE — 90935 HEMODIALYSIS ONE EVALUATION: CPT | Mod: GC

## 2018-02-09 PROCEDURE — 99233 SBSQ HOSP IP/OBS HIGH 50: CPT

## 2018-02-09 RX ADMIN — SEVELAMER CARBONATE 1600 MILLIGRAM(S): 2400 POWDER, FOR SUSPENSION ORAL at 08:14

## 2018-02-09 RX ADMIN — SODIUM CHLORIDE 3 MILLILITER(S): 9 INJECTION INTRAMUSCULAR; INTRAVENOUS; SUBCUTANEOUS at 05:13

## 2018-02-09 RX ADMIN — SEVELAMER CARBONATE 1600 MILLIGRAM(S): 2400 POWDER, FOR SUSPENSION ORAL at 17:59

## 2018-02-09 RX ADMIN — ATORVASTATIN CALCIUM 40 MILLIGRAM(S): 80 TABLET, FILM COATED ORAL at 21:55

## 2018-02-09 RX ADMIN — Medication 1 TABLET(S): at 13:38

## 2018-02-09 RX ADMIN — Medication 100 MILLIGRAM(S): at 21:55

## 2018-02-09 RX ADMIN — SEVELAMER CARBONATE 1600 MILLIGRAM(S): 2400 POWDER, FOR SUSPENSION ORAL at 13:37

## 2018-02-09 RX ADMIN — SODIUM CHLORIDE 3 MILLILITER(S): 9 INJECTION INTRAMUSCULAR; INTRAVENOUS; SUBCUTANEOUS at 13:38

## 2018-02-09 RX ADMIN — SODIUM CHLORIDE 3 MILLILITER(S): 9 INJECTION INTRAMUSCULAR; INTRAVENOUS; SUBCUTANEOUS at 21:56

## 2018-02-09 NOTE — PROGRESS NOTE ADULT - SUBJECTIVE AND OBJECTIVE BOX
Patient is a 52y old  Male who presents with a chief complaint of Missed HD (07 Feb 2018 13:03)      SUBJECTIVE / OVERNIGHT EVENTS: Pt without complaints, continues to refuse exam.    MEDICATIONS  (STANDING):  amLODIPine   Tablet 10 milliGRAM(s) Oral daily  atorvastatin 40 milliGRAM(s) Oral at bedtime  docusate sodium 100 milliGRAM(s) Oral three times a day  heparin  Injectable 5000 Unit(s) SubCutaneous every 12 hours  hydrALAZINE 10 milliGRAM(s) Oral every 8 hours  influenza   Vaccine 0.5 milliLiter(s) IntraMuscular once  Nephro-shavon 1 Tablet(s) Oral daily  sevelamer hydrochloride 1600 milliGRAM(s) Oral three times a day with meals  sodium chloride 0.9% lock flush 3 milliLiter(s) IV Push every 8 hours    MEDICATIONS  (PRN):  polyethylene glycol 3350 17 Gram(s) Oral at bedtime PRN Constipation      Vital Signs Last 24 Hrs  T(C): 36.6 (02-09-18 @ 13:36), Max: 37.1 (02-08-18 @ 20:27)  T(F): 97.8 (02-09-18 @ 13:36), Max: 98.8 (02-08-18 @ 20:27)  HR: 94 (02-09-18 @ 13:36) (63 - 94)  BP: 104/70 (02-09-18 @ 13:36) (104/70 - 152/106)  BP(mean): --  RR: 18 (02-09-18 @ 13:36) (16 - 18)  SpO2: 100% (02-09-18 @ 13:36) (100% - 100%)  CAPILLARY BLOOD GLUCOSE        I&O's Summary    09 Feb 2018 07:01  -  09 Feb 2018 16:11  --------------------------------------------------------  IN: 400 mL / OUT: 2900 mL / NET: -2500 mL    PHYSICAL EXAM: Refused exam  GENERAL: NAD, thin, walking around unit  HEENT: refused  CHEST/LUNG: refused  HEART: refused  ABDOMEN: refused  EXTREMITIES:  refused  PSYCH: AAOx3  NEUROLOGY: refused  SKIN: refused    LABS:                        9.6    4.24  )-----------( 110      ( 09 Feb 2018 06:45 )             27.5     02-09    135  |  96<L>  |  71<H>  ----------------------------<  93  4.4   |  24  |  7.34<H>    Ca    9.0      09 Feb 2018 06:45  Phos  4.6     02-09  Mg     2.6     02-09                RADIOLOGY & ADDITIONAL TESTS:    Imaging Personally Reviewed:    Consultant(s) Notes Reviewed:      Care Discussed with Consultants/Other Providers:

## 2018-02-09 NOTE — PROGRESS NOTE ADULT - PROBLEM SELECTOR PLAN 5
-Patient does not have HD setup near him at his shelter in [place]. Prior to discharge, patient requires outpatient HD to be setup.   -SW aware and working on placement

## 2018-02-09 NOTE — PROGRESS NOTE ADULT - ASSESSMENT
51 yo M with ESRD on HD (MWF), HTN, homeless from shelter p/w HD after not receiving it for a week in the setting of no outpatient HD set-up since patient's move from [place]. Patient previously left AMA prior to setup of outpt HD.

## 2018-02-09 NOTE — PROGRESS NOTE ADULT - PROBLEM SELECTOR PLAN 1
-no s/s of volume overload on exam  -c/w Nephrovite, Phoslo  -Patient not attending regularly scheduled outpatient HD as he does not have any setup near him in [facility]. HD MELINA, Brandie, made aware and working on placement

## 2018-02-09 NOTE — PROGRESS NOTE ADULT - SUBJECTIVE AND OBJECTIVE BOX
[facility] DIVISION OF KIDNEY DISEASES AND HYPERTENSION -- FOLLOW UP NOTE  --------------------------------------------------------------------------------  52 year old male with h/o ESRD on HD(MWF), HTN presented to ER due to missed dialysis. Patient has history of ESRD secondary to HTN and has been on HD for the past 1 year. Patient was recently admitted to [facility] for SOB and missed dialysis. Last HD was on 2/7. Currently patient denies complains of SOB, chest pain, fevers, abdominal pain, nausea, vomiting.         PAST HISTORY  --------------------------------------------------------------------------------  No significant changes to PMH, PSH, FHx, SHx, unless otherwise noted    ALLERGIES & MEDICATIONS  --------------------------------------------------------------------------------  Allergies    No Known Allergies    Intolerances      Standing Inpatient Medications  amLODIPine   Tablet 10 milliGRAM(s) Oral daily  atorvastatin 40 milliGRAM(s) Oral at bedtime  docusate sodium 100 milliGRAM(s) Oral three times a day  heparin  Injectable 5000 Unit(s) SubCutaneous every 12 hours  hydrALAZINE 10 milliGRAM(s) Oral every 8 hours  influenza   Vaccine 0.5 milliLiter(s) IntraMuscular once  Nephro-shavon 1 Tablet(s) Oral daily  sevelamer hydrochloride 1600 milliGRAM(s) Oral three times a day with meals  sodium chloride 0.9% lock flush 3 milliLiter(s) IV Push every 8 hours    PRN Inpatient Medications  polyethylene glycol 3350 17 Gram(s) Oral at bedtime PRN      REVIEW OF SYSTEMS  --------------------------------------------------------------------------------  Gen: No  fevers/chills  Head/Eyes/Ears/Mouth: No headache  Respiratory: No dyspnea, cough  CV: No chest pain,   GI: No abdominal pain, diarrhea, nausea, vomiting  MSK: No edema   Neuro: No dizziness/lightheadedness    All other systems were reviewed and are negative, except as noted.    VITALS/PHYSICAL EXAM  --------------------------------------------------------------------------------  T(C): 36.9 (02-09-18 @ 06:40), Max: 37.1 (02-08-18 @ 20:27)  HR: 63 (02-09-18 @ 06:40) (63 - 71)  BP: 152/106 (02-09-18 @ 06:40) (136/93 - 152/106)  RR: 18 (02-09-18 @ 06:40) (16 - 18)  SpO2: 100% (02-09-18 @ 05:14) (100% - 100%)  Wt(kg): --  Height (cm): 180.34 (02-07-18 @ 18:18)  Weight (kg): 50.5 (02-07-18 @ 18:18)  BMI (kg/m2): 15.5 (02-07-18 @ 18:18)  BSA (m2): 1.64 (02-07-18 @ 18:18)      Physical Exam:  	Gen: Elderly thin male, on room air    	HEENT: anicteric, no JVD  	Pulm: CTA B/L  	CV:  S1S2  	Abd: +BS, soft   	Ext: No B/L Lower ext edema  	Neuro: No focal deficits  	Skin: Warm and dry  	Vascular access: Right IJ tunneled HD catheter present; no erythema or  discharge present at site.     LABS/STUDIES  --------------------------------------------------------------------------------              9.6    4.24  >-----------<  110      [02-09-18 @ 06:45]              27.5     135  |  96  |  71  ----------------------------<  93      [02-09-18 @ 06:45]  4.4   |  24  |  7.34        Ca     9.0     [02-09-18 @ 06:45]      Mg     2.6     [02-09-18 @ 06:45]      Phos  4.6     [02-09-18 @ 06:45]            Creatinine Trend:  SCr 7.34 [02-09 @ 06:45]  SCr 5.12 [02-07 @ 08:00]  SCr 8.15 [02-06 @ 16:16]  SCr 6.13 [01-27 @ 06:15]  SCr 6.63 [01-26 @ 06:15]        Iron 98, TIBC 219, %sat --      [01-20-18 @ 14:00]  Ferritin 813.9      [01-20-18 @ 14:00]  HbA1c 5.5      [02-07-18 @ 08:00]  TSH 1.80      [02-07-18 @ 08:00]  Lipid: chol 106, TG 47, HDL 39, LDL 63      [02-07-18 @ 08:00]    HBsAg NEGATIVE      [01-20-18 @ 11:30]  HCV 0.07, Nonreactive Hepatitis C AB  S/CO Ratio                        Interpretation  < 1.0                                     Non-Reactive  1.0 - 4.9                           Weakly-Reactive  > 5.0                                 Reactive  Non-Reactive: Aperson with a non-reactive HCV antibody  result is considered uninfected.  No further action is  needed unless recent infection is suspected.  In these  cases, consider repeat testing later to detect  seroconversion..  Weakly-Reactive: HCV antibody test is abnormal, HCV RNA  Qualitative test will follow.  Reactive: HCV antibody test is abnormal, HCV RNA  Qualitative test will follow.  Note: HCV antibody testing is performed on the [employer]   system.      [01-20-18 @ 09:00]

## 2018-02-09 NOTE — PROGRESS NOTE ADULT - PROBLEM SELECTOR PLAN 1
Patient with h/o ESRD on HD. Last HD was done on 2/7, tolerated well without complications. HD catheter was working well. Will arrange for maintenance HD today. Monitor BMP daily.

## 2018-02-10 PROCEDURE — 99233 SBSQ HOSP IP/OBS HIGH 50: CPT

## 2018-02-10 PROCEDURE — 99232 SBSQ HOSP IP/OBS MODERATE 35: CPT | Mod: GC

## 2018-02-10 RX ADMIN — SODIUM CHLORIDE 3 MILLILITER(S): 9 INJECTION INTRAMUSCULAR; INTRAVENOUS; SUBCUTANEOUS at 21:30

## 2018-02-10 RX ADMIN — Medication 1 TABLET(S): at 12:02

## 2018-02-10 RX ADMIN — SEVELAMER CARBONATE 1600 MILLIGRAM(S): 2400 POWDER, FOR SUSPENSION ORAL at 12:02

## 2018-02-10 RX ADMIN — SODIUM CHLORIDE 3 MILLILITER(S): 9 INJECTION INTRAMUSCULAR; INTRAVENOUS; SUBCUTANEOUS at 05:32

## 2018-02-10 RX ADMIN — SEVELAMER CARBONATE 1600 MILLIGRAM(S): 2400 POWDER, FOR SUSPENSION ORAL at 17:37

## 2018-02-10 RX ADMIN — AMLODIPINE BESYLATE 10 MILLIGRAM(S): 2.5 TABLET ORAL at 05:31

## 2018-02-10 RX ADMIN — SODIUM CHLORIDE 3 MILLILITER(S): 9 INJECTION INTRAMUSCULAR; INTRAVENOUS; SUBCUTANEOUS at 14:18

## 2018-02-10 RX ADMIN — ATORVASTATIN CALCIUM 40 MILLIGRAM(S): 80 TABLET, FILM COATED ORAL at 21:30

## 2018-02-10 RX ADMIN — SEVELAMER CARBONATE 1600 MILLIGRAM(S): 2400 POWDER, FOR SUSPENSION ORAL at 09:28

## 2018-02-10 NOTE — PROGRESS NOTE ADULT - SUBJECTIVE AND OBJECTIVE BOX
[facility] DIVISION OF KIDNEY DISEASES AND HYPERTENSION -- FOLLOW UP NOTE  --------------------------------------------------------------------------------  52 year old male with h/o ESRD on HD(MWF), HTN presented to ER due to missed dialysis. Patient has history of ESRD secondary to HTN and has been on HD for the past 1 year. Patient was recently admitted to [facility] for SOB and missed dialysis. Last HD was on 2/9. Currently patient denies complains of SOB, chest pain, fevers, abdominal pain, nausea, vomiting.         PAST HISTORY  --------------------------------------------------------------------------------  No significant changes to PMH, PSH, FHx, SHx, unless otherwise noted    ALLERGIES & MEDICATIONS  --------------------------------------------------------------------------------  Allergies    No Known Allergies    Intolerances      Standing Inpatient Medications  amLODIPine   Tablet 10 milliGRAM(s) Oral daily  atorvastatin 40 milliGRAM(s) Oral at bedtime  docusate sodium 100 milliGRAM(s) Oral three times a day  heparin  Injectable 5000 Unit(s) SubCutaneous every 12 hours  hydrALAZINE 10 milliGRAM(s) Oral every 8 hours  influenza   Vaccine 0.5 milliLiter(s) IntraMuscular once  Nephro-shavon 1 Tablet(s) Oral daily  sevelamer hydrochloride 1600 milliGRAM(s) Oral three times a day with meals  sodium chloride 0.9% lock flush 3 milliLiter(s) IV Push every 8 hours    PRN Inpatient Medications  polyethylene glycol 3350 17 Gram(s) Oral at bedtime PRN      REVIEW OF SYSTEMS    --------------------------------------------------------------------------------  Gen: No  fevers/chills  Skin: no rashes  Head/Eyes/Ears/Mouth: No headache  Respiratory: No dyspnea, cough  CV: No chest pain,   GI: No abdominal pain, diarrhea, nausea, vomiting  MSK: No edema   Neuro: No dizziness/lightheadedness    All other systems were reviewed and are negative, except as noted.    VITALS/PHYSICAL EXAM  --------------------------------------------------------------------------------  T(C): 37 (02-10-18 @ 05:28), Max: 37 (02-10-18 @ 05:28)  HR: 74 (02-10-18 @ 05:28) (73 - 94)  BP: 128/89 (02-10-18 @ 05:28) (103/78 - 138/91)  RR: 17 (02-10-18 @ 05:28) (17 - 18)  SpO2: 100% (02-10-18 @ 05:28) (100% - 100%)  Wt(kg): --        02-09-18 @ 07:01  -  02-10-18 @ 07:00  --------------------------------------------------------  IN: 400 mL / OUT: 2900 mL / NET: -2500 mL      Physical Exam:  	Gen: Elderly thin male, on room air    	HEENT: anicteric, no JVD  	Pulm: CTA B/L  	CV:  S1S2  	Abd: +BS, soft   	Ext: No B/L Lower ext edema  	Neuro: No focal deficits  	Skin: Warm and dry  	Vascular access: Right IJ tunneled HD catheter present; no erythema or  discharge present at site.     LABS/STUDIES  --------------------------------------------------------------------------------              9.6    4.24  >-----------<  110      [02-09-18 @ 06:45]              27.5     135  |  96  |  71  ----------------------------<  93      [02-09-18 @ 06:45]  4.4   |  24  |  7.34        Ca     9.0     [02-09-18 @ 06:45]      Mg     2.6     [02-09-18 @ 06:45]      Phos  4.6     [02-09-18 @ 06:45]            Creatinine Trend:  SCr 7.34 [02-09 @ 06:45]  SCr 5.12 [02-07 @ 08:00]  SCr 8.15 [02-06 @ 16:16]  SCr 6.13 [01-27 @ 06:15]  SCr 6.63 [01-26 @ 06:15]        Iron 98, TIBC 219, %sat --      [01-20-18 @ 14:00]  Ferritin 813.9      [01-20-18 @ 14:00]  HbA1c 5.5      [02-07-18 @ 08:00]  TSH 1.80      [02-07-18 @ 08:00]  Lipid: chol 106, TG 47, HDL 39, LDL 63      [02-07-18 @ 08:00]    HBsAg NEGATIVE      [01-20-18 @ 11:30]  HCV 0.07, Nonreactive Hepatitis C AB  S/CO Ratio                        Interpretation  < 1.0                                     Non-Reactive  1.0 - 4.9                           Weakly-Reactive  > 5.0                                 Reactive  Non-Reactive: Aperson with a non-reactive HCV antibody  result is considered uninfected.  No further action is  needed unless recent infection is suspected.  In these  cases, consider repeat testing later to detect  seroconversion..  Weakly-Reactive: HCV antibody test is abnormal, HCV RNA  Qualitative test will follow.  Reactive: HCV antibody test is abnormal, HCV RNA  Qualitative test will follow.  Note: HCV antibody testing is performed on the [employer]   system.      [01-20-18 @ 09:00]

## 2018-02-10 NOTE — PROGRESS NOTE ADULT - PROBLEM SELECTOR PLAN 1
-no s/s of volume overload on exam  -c/w Nephrovite, Phoslo  -Patient not attending regularly scheduled outpatient HD as he does not have any setup near him in [facility]. HD MELINA, Brandie, made aware and working on placement

## 2018-02-10 NOTE — PROGRESS NOTE ADULT - SUBJECTIVE AND OBJECTIVE BOX
Patient is a 52y old  Male who presents with a chief complaint of Missed HD (07 Feb 2018 13:03)      SUBJECTIVE / OVERNIGHT EVENTS: Pt without complaints. No events. Wandering around unit. Refusing exam.    MEDICATIONS  (STANDING):  amLODIPine   Tablet 10 milliGRAM(s) Oral daily  atorvastatin 40 milliGRAM(s) Oral at bedtime  docusate sodium 100 milliGRAM(s) Oral three times a day  heparin  Injectable 5000 Unit(s) SubCutaneous every 12 hours  hydrALAZINE 10 milliGRAM(s) Oral every 8 hours  influenza   Vaccine 0.5 milliLiter(s) IntraMuscular once  Nephro-shavon 1 Tablet(s) Oral daily  sevelamer hydrochloride 1600 milliGRAM(s) Oral three times a day with meals  sodium chloride 0.9% lock flush 3 milliLiter(s) IV Push every 8 hours    MEDICATIONS  (PRN):  polyethylene glycol 3350 17 Gram(s) Oral at bedtime PRN Constipation      Vital Signs Last 24 Hrs  T(C): 37.1 (02-10-18 @ 13:19), Max: 37.1 (02-10-18 @ 13:19)  T(F): 98.7 (02-10-18 @ 13:19), Max: 98.7 (02-10-18 @ 13:19)  HR: 64 (02-10-18 @ 15:02) (64 - 99)  BP: 117/82 (02-10-18 @ 15:02) (103/78 - 160/85)  BP(mean): --  RR: 17 (02-10-18 @ 13:19) (17 - 18)  SpO2: 100% (02-10-18 @ 13:19) (100% - 100%)  CAPILLARY BLOOD GLUCOSE        I&O's Summary    09 Feb 2018 07:01  -  10 Feb 2018 07:00  --------------------------------------------------------  IN: 400 mL / OUT: 2900 mL / NET: -2500 mL    PHYSICAL EXAM: Refused exam  GENERAL: NAD, thin, walking around unit  HEENT: refused  CHEST/LUNG: refused  HEART: refused  ABDOMEN: refused  EXTREMITIES:  refused  PSYCH: AAOx3  NEUROLOGY: refused  SKIN: refused      LABS:                        9.6    4.24  )-----------( 110      ( 09 Feb 2018 06:45 )             27.5     02-09    135  |  96<L>  |  71<H>  ----------------------------<  93  4.4   |  24  |  7.34<H>    Ca    9.0      09 Feb 2018 06:45  Phos  4.6     02-09  Mg     2.6     02-09                RADIOLOGY & ADDITIONAL TESTS:    Imaging Personally Reviewed:    Consultant(s) Notes Reviewed:      Care Discussed with Consultants/Other Providers:

## 2018-02-10 NOTE — PROGRESS NOTE ADULT - PROBLEM SELECTOR PLAN 1
Patient with h/o ESRD on HD. Last HD was done on 2/9, tolerated well without complications. HD catheter was working well. No plans for HD today. Monitor BMP and BP

## 2018-02-10 NOTE — PROGRESS NOTE ADULT - ASSESSMENT
51 yo M with ESRD on HD (MWF), HTN, homeless from shelter p/w HD after not receiving it for a week in the setting of no outpatient HD set-up since patient's move from [place]. Patient previously left AMA prior to setup of outpt HD.

## 2018-02-11 PROCEDURE — 99233 SBSQ HOSP IP/OBS HIGH 50: CPT

## 2018-02-11 RX ADMIN — Medication 100 MILLIGRAM(S): at 21:01

## 2018-02-11 RX ADMIN — ATORVASTATIN CALCIUM 40 MILLIGRAM(S): 80 TABLET, FILM COATED ORAL at 21:01

## 2018-02-11 RX ADMIN — SODIUM CHLORIDE 3 MILLILITER(S): 9 INJECTION INTRAMUSCULAR; INTRAVENOUS; SUBCUTANEOUS at 05:25

## 2018-02-11 RX ADMIN — Medication 1 TABLET(S): at 13:09

## 2018-02-11 RX ADMIN — SODIUM CHLORIDE 3 MILLILITER(S): 9 INJECTION INTRAMUSCULAR; INTRAVENOUS; SUBCUTANEOUS at 21:01

## 2018-02-11 RX ADMIN — Medication 100 MILLIGRAM(S): at 13:09

## 2018-02-11 RX ADMIN — Medication 10 MILLIGRAM(S): at 21:01

## 2018-02-11 RX ADMIN — SODIUM CHLORIDE 3 MILLILITER(S): 9 INJECTION INTRAMUSCULAR; INTRAVENOUS; SUBCUTANEOUS at 13:12

## 2018-02-11 RX ADMIN — SEVELAMER CARBONATE 1600 MILLIGRAM(S): 2400 POWDER, FOR SUSPENSION ORAL at 13:09

## 2018-02-11 RX ADMIN — SEVELAMER CARBONATE 1600 MILLIGRAM(S): 2400 POWDER, FOR SUSPENSION ORAL at 09:47

## 2018-02-11 RX ADMIN — AMLODIPINE BESYLATE 10 MILLIGRAM(S): 2.5 TABLET ORAL at 05:26

## 2018-02-11 NOTE — PROGRESS NOTE ADULT - PROBLEM SELECTOR PLAN 1
-no s/s of volume overload on exam  -c/w Nephrovite, Phoslo  -Patient not attending regularly scheduled outpatient HD as he does not have any setup near him in [facility]. HD MELINA, Brandie, made aware and working on placement

## 2018-02-11 NOTE — PROGRESS NOTE ADULT - SUBJECTIVE AND OBJECTIVE BOX
Patient is a 52y old  Male who presents with a chief complaint of Missed HD (07 Feb 2018 13:03)      SUBJECTIVE / OVERNIGHT EVENTS: Pt wandering around unit, refuses exam.    MEDICATIONS  (STANDING):  amLODIPine   Tablet 10 milliGRAM(s) Oral daily  atorvastatin 40 milliGRAM(s) Oral at bedtime  docusate sodium 100 milliGRAM(s) Oral three times a day  heparin  Injectable 5000 Unit(s) SubCutaneous every 12 hours  hydrALAZINE 10 milliGRAM(s) Oral every 8 hours  influenza   Vaccine 0.5 milliLiter(s) IntraMuscular once  Nephro-shavon 1 Tablet(s) Oral daily  sevelamer hydrochloride 1600 milliGRAM(s) Oral three times a day with meals  sodium chloride 0.9% lock flush 3 milliLiter(s) IV Push every 8 hours    MEDICATIONS  (PRN):  polyethylene glycol 3350 17 Gram(s) Oral at bedtime PRN Constipation      Vital Signs Last 24 Hrs  T(C): 36.8 (02-11-18 @ 13:12), Max: 37.6 (02-10-18 @ 21:29)  T(F): 98.3 (02-11-18 @ 13:12), Max: 99.7 (02-10-18 @ 21:29)  HR: 82 (02-11-18 @ 13:12) (82 - 85)  BP: 136/95 (02-11-18 @ 13:12) (95/70 - 136/95)  BP(mean): --  RR: 18 (02-11-18 @ 13:12) (17 - 18)  SpO2: 100% (02-11-18 @ 13:12) (100% - 100%)  CAPILLARY BLOOD GLUCOSE        I&O's Summary    PHYSICAL EXAM: Refused exam  GENERAL: NAD, thin, walking around unit  HEENT: refused  CHEST/LUNG: refused  HEART: refused  ABDOMEN: refused  EXTREMITIES:  refused  PSYCH: AAOx3  NEUROLOGY: refused  SKIN: refused    LABS:                    RADIOLOGY & ADDITIONAL TESTS:    Imaging Personally Reviewed:    Consultant(s) Notes Reviewed:      Care Discussed with Consultants/Other Providers:

## 2018-02-11 NOTE — PROGRESS NOTE ADULT - ASSESSMENT
53 yo M with ESRD on HD (MWF), HTN, homeless from shelter p/w HD after not receiving it for a week in the setting of no outpatient HD set-up since patient's move from [place]. Patient previously left AMA prior to setup of outpt HD.

## 2018-02-11 NOTE — PROGRESS NOTE ADULT - PROBLEM SELECTOR PLAN 5
-Patient does not have HD setup near him at his shelter in [place]. Prior to discharge, patient requires outpatient HD to be setup.   -SW aware and working on placement

## 2018-02-12 VITALS
OXYGEN SATURATION: 99 % | SYSTOLIC BLOOD PRESSURE: 126 MMHG | DIASTOLIC BLOOD PRESSURE: 96 MMHG | HEART RATE: 85 BPM | RESPIRATION RATE: 18 BRPM

## 2018-02-12 DIAGNOSIS — D64.9 ANEMIA, UNSPECIFIED: ICD-10-CM

## 2018-02-12 PROCEDURE — 99233 SBSQ HOSP IP/OBS HIGH 50: CPT | Mod: GC

## 2018-02-12 PROCEDURE — 99239 HOSP IP/OBS DSCHRG MGMT >30: CPT

## 2018-02-12 RX ORDER — ERYTHROPOIETIN 10000 [IU]/ML
4000 INJECTION, SOLUTION INTRAVENOUS; SUBCUTANEOUS
Qty: 0 | Refills: 0 | Status: DISCONTINUED | OUTPATIENT
Start: 2018-02-12 | End: 2018-02-12

## 2018-02-12 RX ORDER — LISINOPRIL 2.5 MG/1
1 TABLET ORAL
Qty: 0 | Refills: 0 | COMMUNITY

## 2018-02-12 RX ORDER — HYDRALAZINE HCL 50 MG
1 TABLET ORAL
Qty: 90 | Refills: 0 | OUTPATIENT
Start: 2018-02-12 | End: 2018-03-13

## 2018-02-12 RX ADMIN — SODIUM CHLORIDE 3 MILLILITER(S): 9 INJECTION INTRAMUSCULAR; INTRAVENOUS; SUBCUTANEOUS at 05:29

## 2018-02-12 RX ADMIN — SEVELAMER CARBONATE 1600 MILLIGRAM(S): 2400 POWDER, FOR SUSPENSION ORAL at 12:41

## 2018-02-12 RX ADMIN — SODIUM CHLORIDE 3 MILLILITER(S): 9 INJECTION INTRAMUSCULAR; INTRAVENOUS; SUBCUTANEOUS at 14:31

## 2018-02-12 RX ADMIN — ERYTHROPOIETIN 4000 UNIT(S): 10000 INJECTION, SOLUTION INTRAVENOUS; SUBCUTANEOUS at 10:05

## 2018-02-12 RX ADMIN — Medication 100 MILLIGRAM(S): at 05:29

## 2018-02-12 RX ADMIN — Medication 1 TABLET(S): at 12:41

## 2018-02-12 NOTE — PROGRESS NOTE ADULT - SUBJECTIVE AND OBJECTIVE BOX
[facility] DIVISION OF KIDNEY DISEASES AND HYPERTENSION -- FOLLOW UP NOTE  --------------------------------------------------------------------------------  52 year old male with h/o ESRD on HD(MWF), HTN presented to ER due to missed dialysis. Patient has history of ESRD secondary to HTN and has been on HD for the past 1 year. Patient was recently admitted to [facility] for SOB and missed dialysis. Last HD was on 2/9. Currently patient denies complains of SOB, chest pain, fevers, abdominal pain, nausea, vomiting.         PAST HISTORY  --------------------------------------------------------------------------------  No significant changes to PMH, PSH, FHx, SHx, unless otherwise noted    ALLERGIES & MEDICATIONS  --------------------------------------------------------------------------------  Allergies    No Known Allergies    Intolerances      Standing Inpatient Medications  amLODIPine   Tablet 10 milliGRAM(s) Oral daily  atorvastatin 40 milliGRAM(s) Oral at bedtime  docusate sodium 100 milliGRAM(s) Oral three times a day  heparin  Injectable 5000 Unit(s) SubCutaneous every 12 hours  hydrALAZINE 10 milliGRAM(s) Oral every 8 hours  influenza   Vaccine 0.5 milliLiter(s) IntraMuscular once  Nephro-sahvon 1 Tablet(s) Oral daily  sevelamer hydrochloride 1600 milliGRAM(s) Oral three times a day with meals  sodium chloride 0.9% lock flush 3 milliLiter(s) IV Push every 8 hours    PRN Inpatient Medications  polyethylene glycol 3350 17 Gram(s) Oral at bedtime PRN      REVIEW OF SYSTEMS  --------------------------------------------------------------------------------  Gen: No  fevers/chills  Skin: no rashes  Head/Eyes/Ears/Mouth: No headache  Respiratory: No dyspnea, cough  CV: No chest pain,   GI: No abdominal pain, diarrhea, nausea, vomiting  MSK: No edema   Neuro: No dizziness/lightheadedness    All other systems were reviewed and are negative, except as noted.      VITALS/PHYSICAL EXAM  --------------------------------------------------------------------------------  T(C): 36.7 (02-12-18 @ 06:30), Max: 36.8 (02-11-18 @ 13:12)  HR: 66 (02-12-18 @ 06:30) (66 - 82)  BP: 119/98 (02-12-18 @ 06:30) (119/98 - 136/95)  RR: 18 (02-12-18 @ 06:30) (18 - 18)  SpO2: 100% (02-12-18 @ 05:25) (100% - 100%)  Wt(kg): --        Physical Exam:  	Gen: NAD  	HEENT: anicteric, no JVD  	Pulm: CTA B/L  	CV:  S1S2  	Abd: +BS, soft   	Ext: No B/L Lower ext edema  	Neuro: No focal deficits  	Skin: Warm and dry  	Vascular access: Right IJ tunneled HD catheter present; no erythema or  discharge present at site.       LABS/STUDIES  --------------------------------------------------------------------------------    Creatinine Trend:  SCr 7.34 [02-09 @ 06:45]  SCr 5.12 [02-07 @ 08:00]  SCr 8.15 [02-06 @ 16:16]  SCr 6.13 [01-27 @ 06:15]  SCr 6.63 [01-26 @ 06:15]        Iron 98, TIBC 219, %sat --      [01-20-18 @ 14:00]  Ferritin 813.9      [01-20-18 @ 14:00]  HbA1c 5.5      [02-07-18 @ 08:00]  TSH 1.80      [02-07-18 @ 08:00]  Lipid: chol 106, TG 47, HDL 39, LDL 63      [02-07-18 @ 08:00]    HBsAg NEGATIVE      [01-20-18 @ 11:30]  HCV 0.07, Nonreactive Hepatitis C AB  S/CO Ratio                        Interpretation  < 1.0                                     Non-Reactive  1.0 - 4.9                           Weakly-Reactive  > 5.0                                 Reactive  Non-Reactive: Aperson with a non-reactive HCV antibody  result is considered uninfected.  No further action is  needed unless recent infection is suspected.  In these  cases, consider repeat testing later to detect  seroconversion..  Weakly-Reactive: HCV antibody test is abnormal, HCV RNA  Qualitative test will follow.  Reactive: HCV antibody test is abnormal, HCV RNA  Qualitative test will follow.  Note: HCV antibody testing is performed on the [employer] system.      [01-20-18 @ 09:00]

## 2018-02-12 NOTE — PROGRESS NOTE ADULT - SUBJECTIVE AND OBJECTIVE BOX
Patient is a 52y old  Male who presents with a chief complaint of Missed HD (07 Feb 2018 13:03)      SUBJECTIVE / OVERNIGHT EVENTS:    MEDICATIONS  (STANDING):  amLODIPine   Tablet 10 milliGRAM(s) Oral daily  atorvastatin 40 milliGRAM(s) Oral at bedtime  docusate sodium 100 milliGRAM(s) Oral three times a day  epoetin steffi Injectable 4000 Unit(s) IV Push <User Schedule>  heparin  Injectable 5000 Unit(s) SubCutaneous every 12 hours  hydrALAZINE 10 milliGRAM(s) Oral every 8 hours  influenza   Vaccine 0.5 milliLiter(s) IntraMuscular once  Nephro-shavon 1 Tablet(s) Oral daily  sevelamer hydrochloride 1600 milliGRAM(s) Oral three times a day with meals  sodium chloride 0.9% lock flush 3 milliLiter(s) IV Push every 8 hours    MEDICATIONS  (PRN):  polyethylene glycol 3350 17 Gram(s) Oral at bedtime PRN Constipation      Vital Signs Last 24 Hrs  T(C): 36.7 (12 Feb 2018 10:00), Max: 36.7 (11 Feb 2018 20:59)  T(F): 98 (12 Feb 2018 10:00), Max: 98.1 (11 Feb 2018 20:59)  HR: 85 (12 Feb 2018 12:40) (62 - 85)  BP: 126/96 (12 Feb 2018 12:40) (119/98 - 145/99)  BP(mean): --  RR: 18 (12 Feb 2018 12:40) (16 - 18)  SpO2: 99% (12 Feb 2018 12:40) (99% - 100%)  CAPILLARY BLOOD GLUCOSE        I&O's Summary    12 Feb 2018 07:01  -  12 Feb 2018 18:05  --------------------------------------------------------  IN: 600 mL / OUT: 3000 mL / NET: -2400 mL        PHYSICAL EXAM:  GENERAL: NAD, well-developed  HEAD:  Atraumatic, Normocephalic  EYES: EOMI, PERRLA, conjunctiva and sclera clear  NECK: Supple, No JVD  CHEST/LUNG: Clear to auscultation bilaterally; No wheeze  HEART: Regular rate and rhythm; No murmurs, rubs, or gallops  ABDOMEN: Soft, Nontender, Nondistended; Bowel sounds present  EXTREMITIES:  2+ Peripheral Pulses, No clubbing, cyanosis, or edema  PSYCH: AAOx3  NEUROLOGY: non-focal  SKIN: No rashes or lesions    LABS:                    RADIOLOGY & ADDITIONAL TESTS:    Imaging Personally Reviewed:    Consultant(s) Notes Reviewed:      Care Discussed with Consultants/Other Providers: Patient is a 52y old  Male who presents with a chief complaint of Missed HD (07 Feb 2018 13:03)      SUBJECTIVE / OVERNIGHT EVENTS: patient seen  by bedside, refused physical exam, no apparent distress note, pt wants to get out today       MEDICATIONS  (STANDING):  amLODIPine   Tablet 10 milliGRAM(s) Oral daily  atorvastatin 40 milliGRAM(s) Oral at bedtime  docusate sodium 100 milliGRAM(s) Oral three times a day  epoetin steffi Injectable 4000 Unit(s) IV Push <User Schedule>  heparin  Injectable 5000 Unit(s) SubCutaneous every 12 hours  hydrALAZINE 10 milliGRAM(s) Oral every 8 hours  influenza   Vaccine 0.5 milliLiter(s) IntraMuscular once  Nephro-shavon 1 Tablet(s) Oral daily  sevelamer hydrochloride 1600 milliGRAM(s) Oral three times a day with meals  sodium chloride 0.9% lock flush 3 milliLiter(s) IV Push every 8 hours    MEDICATIONS  (PRN):  polyethylene glycol 3350 17 Gram(s) Oral at bedtime PRN Constipation      Vital Signs Last 24 Hrs  T(C): 36.7 (12 Feb 2018 10:00), Max: 36.7 (11 Feb 2018 20:59)  T(F): 98 (12 Feb 2018 10:00), Max: 98.1 (11 Feb 2018 20:59)  HR: 85 (12 Feb 2018 12:40) (62 - 85)  BP: 126/96 (12 Feb 2018 12:40) (119/98 - 145/99)  BP(mean): --  RR: 18 (12 Feb 2018 12:40) (16 - 18)  SpO2: 99% (12 Feb 2018 12:40) (99% - 100%)  CAPILLARY BLOOD GLUCOSE        I&O's Summary    12 Feb 2018 07:01  -  12 Feb 2018 18:05  --------------------------------------------------------  IN: 600 mL / OUT: 3000 mL / NET: -2400 mL        PHYSICAL EXAM:  refused physical exam  LABS:            no new labs         RADIOLOGY & ADDITIONAL TESTS:    Imaging Personally Reviewed:    Consultant(s) Notes Reviewed:      Care Discussed with Consultants/Other Providers:

## 2018-02-12 NOTE — PROGRESS NOTE ADULT - PROBLEM SELECTOR PLAN 4
-IMPROVE=0, dc pharmacologic ppx

## 2018-02-12 NOTE — PROGRESS NOTE ADULT - PROVIDER SPECIALTY LIST ADULT
Hospitalist
Nephrology
Hospitalist

## 2018-02-12 NOTE — PROGRESS NOTE ADULT - PROBLEM SELECTOR PROBLEM 5
Discharge planning issues

## 2018-02-12 NOTE — PROGRESS NOTE ADULT - PROBLEM SELECTOR PLAN 2
-c/w amlodipine and hydralazine
BP in acceptable range.  Monitor BP on current meds. Low salt diet advised
BP in acceptable range.  Monitor BP on current meds. Low salt diet advised
BP in acceptable range. Pt. to get HD today. Monitor BP on current meds. Low salt diet advised
-c/w amlodipine and hydralazine
-c/w amlodipine and hydralazine

## 2018-02-12 NOTE — PROGRESS NOTE ADULT - PROBLEM SELECTOR PLAN 1
-no s/s of volume overload on exam  -c/w Nephrovite, Phoslo  -Patient not attending regularly scheduled outpatient HD as he does not have any setup near him in [facility]. HD MELINA, Brandie, made aware and working on placement -no s/s of volume overload   -c/w Nephrovite, Phoslo  -Patient not attending regularly scheduled outpatient HD as he does not have any setup near him in [facility]. HD   Pt accepted to \[facility]\"" dialysis center, ,first session on 2/13/18

## 2018-02-12 NOTE — PROGRESS NOTE ADULT - PROBLEM SELECTOR PLAN 5
-Patient does not have HD setup near him at his shelter in [place]. Prior to discharge, patient requires outpatient HD to be setup.   -SW aware and working on placement Patient accepted at [facility] dialysis [address] [facility]  [phone] phone  on a TTS 8:00pm schedule .Start date 02/13/2018 at  7:45pm.  Pt to be discharged to shelter: [facility]

## 2018-02-12 NOTE — PROGRESS NOTE ADULT - PROBLEM SELECTOR PLAN 1
Patient with h/o ESRD on HD. Last HD was done on 2/9, tolerated well without complications. HD catheter was working well. Plan for maintenance HD today. Monitor BMP and BP

## 2018-02-12 NOTE — PROGRESS NOTE ADULT - ASSESSMENT
53 yo M with ESRD on HD (MWF), HTN, homeless from shelter p/w HD after not receiving it for a week in the setting of no outpatient HD set-up since patient's move from [place]. Patient previously left AMA prior to setup of outpt HD.

## 2018-08-29 NOTE — BEHAVIORAL HEALTH ASSESSMENT NOTE - RELATEDNESS
Left VM requesting call back to my direct line. Advised this was in regards to scheduling.     SR 08/29/2018 @ 938 A   
Fair

## 2021-06-14 NOTE — H&P ADULT - MS EXT PE MLT D E PC
Discussed with pt.   Scheduled.   Tess Ayala RN BSN PHN  Device Nurse   [facility]     no cyanosis/no clubbing no clubbing/no cyanosis/normal

## 2024-06-05 NOTE — H&P ADULT - NSHPPHYSICALEXAM_GEN_ALL_CORE
Photo Preface (Leave Blank If You Do Not Want): Photographs were obtained today Detail Level: Zone Vital Signs Last 24 Hrs  T(C): 37 (06 Feb 2018 20:55), Max: 37 (06 Feb 2018 20:55)  T(F): 98.6 (06 Feb 2018 20:55), Max: 98.6 (06 Feb 2018 20:55)  HR: 65 (06 Feb 2018 20:55) (56 - 69)  BP: 173/102 (06 Feb 2018 20:55) (165/109 - 196/121)  BP(mean): --  RR: 18 (06 Feb 2018 20:55) (16 - 18)  SpO2: 100% (06 Feb 2018 20:29) (100% - 100%)

## 2025-01-16 NOTE — ED PROVIDER NOTE - NS ED ATTENDING STATEMENT MOD
General Sunscreen Counseling: I recommended a broad spectrum sunscreen with a SPF of 30 or higher.  I explained that SPF 30 sunscreens block approximately 97 percent of the sun's harmful rays.  Sunscreens should be applied at least 15 minutes prior to expected sun exposure and then every 2 hours after that as long as sun exposure continues. If swimming or exercising sunscreen should be reapplied every 45 minutes to an hour after getting wet or sweating.  One ounce, or the equivalent of a shot glass full of sunscreen, is adequate to protect the skin not covered by a bathing suit. I also recommended a lip balm with a sunscreen as well. Sun protective clothing can be used in lieu of sunscreen but must be worn the entire time you are exposed to the sun's rays.
Detail Level: Generalized
I have personally seen and examined this patient.  I have fully participated in the care of this patient. I have reviewed all pertinent clinical information, including history, physical exam, plan and the Resident’s note and agree except as noted.